# Patient Record
Sex: FEMALE | HISPANIC OR LATINO | Employment: UNEMPLOYED | URBAN - METROPOLITAN AREA
[De-identification: names, ages, dates, MRNs, and addresses within clinical notes are randomized per-mention and may not be internally consistent; named-entity substitution may affect disease eponyms.]

---

## 2022-06-08 ENCOUNTER — OFFICE VISIT (OUTPATIENT)
Dept: OBSTETRICS AND GYNECOLOGY | Facility: CLINIC | Age: 27
End: 2022-06-08

## 2022-06-08 VITALS
WEIGHT: 157 LBS | DIASTOLIC BLOOD PRESSURE: 72 MMHG | BODY MASS INDEX: 30.82 KG/M2 | SYSTOLIC BLOOD PRESSURE: 104 MMHG | HEIGHT: 60 IN

## 2022-06-08 DIAGNOSIS — N92.6 MISSED MENSES: Primary | ICD-10-CM

## 2022-06-08 LAB
B-HCG UR QL: POSITIVE
BILIRUB BLD-MCNC: NEGATIVE MG/DL
CLARITY, POC: CLEAR
COLOR UR: YELLOW
EXPIRATION DATE: ABNORMAL
GLUCOSE UR STRIP-MCNC: NEGATIVE MG/DL
INTERNAL NEGATIVE CONTROL: ABNORMAL
INTERNAL POSITIVE CONTROL: ABNORMAL
KETONES UR QL: NEGATIVE
LEUKOCYTE EST, POC: NEGATIVE
Lab: ABNORMAL
NITRITE UR-MCNC: NEGATIVE MG/ML
PH UR: 5 [PH] (ref 5–8)
PROT UR STRIP-MCNC: NEGATIVE MG/DL
RBC # UR STRIP: NEGATIVE /UL
SP GR UR: 1 (ref 1–1.03)
UROBILINOGEN UR QL: NORMAL

## 2022-06-08 PROCEDURE — 81002 URINALYSIS NONAUTO W/O SCOPE: CPT | Performed by: OBSTETRICS & GYNECOLOGY

## 2022-06-08 PROCEDURE — 81025 URINE PREGNANCY TEST: CPT | Performed by: OBSTETRICS & GYNECOLOGY

## 2022-06-08 PROCEDURE — 99203 OFFICE O/P NEW LOW 30 MIN: CPT | Performed by: OBSTETRICS & GYNECOLOGY

## 2022-06-08 RX ORDER — PRENATAL VIT NO.126/IRON/FOLIC 28MG-0.8MG
1 TABLET ORAL DAILY
COMMUNITY

## 2022-06-08 NOTE — PROGRESS NOTES
New GYN Exam    CC- Here for missed menses    Vi Marmolejo is a 27 y.o. female new patient who presents for missed menses. She just delivered in 2022 and is pregnant again. Her LMP was 3/7/2022, which would make her 13.1 weeks by dates.  Ultrasound today shows a single IUP at 8.3 weeks with a fetal heart rate of 166.  GARRETT is 1/15/2023.  There is noted to be right ovarian cyst that measures 1.7 x 1.6 x 1.3 cm.  There is no comparable imaging data.  We did discuss that she is not quite as far along as she thought.  This is her fourth pregnancy.  Her last pregnancy was a delivery in [place] on 2022.  She was induced at 37 weeks secondary to oligohydramnios.  These records have been requested.  The patient is Faroese-speaking only and a  phone was used for the visit.      OB History        4    Para   3    Term   3            AB        Living   3       SAB        IAB        Ectopic        Molar        Multiple        Live Births              Obstetric Comments   - term, - 6.11#  - term,  6.7 #  2022-  37 weeks, IOL for oligo, 6.15#  present             Menarche: 12  Current contraception: none  History of abnormal Pap smear: no  History of abnormal mammogram: no  Family history of uterine, colon or ovarian cancer: no  Family history of breast cancer: no  H/o STDs: none  Last pap:uncertain  Gardasil:missed  SABINE: none    Health Maintenance   Topic Date Due   • Annual Gynecologic Pelvic and Breast Exam  Never done   • ANNUAL PHYSICAL  Never done   • COVID-19 Vaccine (1) Never done   • TDAP/TD VACCINES (1 - Tdap) Never done   • HEPATITIS C SCREENING  Never done   • PAP SMEAR  Never done   • INFLUENZA VACCINE  10/01/2022   • Pneumococcal Vaccine 0-64  Aged Out       History reviewed. No pertinent past medical history.    Past Surgical History:   Procedure Laterality Date   • NO PAST SURGERIES           Current Outpatient Medications:   •  prenatal vitamin (prenatal, CLASSIC,  "vitamin) tablet, Take 1 tablet by mouth Daily., Disp: , Rfl:     No Known Allergies    Social History     Tobacco Use   • Smoking status: Never Smoker   Vaping Use   • Vaping Use: Never used   Substance Use Topics   • Alcohol use: Not Currently   • Drug use: Never       Family History   Problem Relation Age of Onset   • Breast cancer Neg Hx    • Ovarian cancer Neg Hx    • Uterine cancer Neg Hx    • Colon cancer Neg Hx    • Deep vein thrombosis Neg Hx    • Pulmonary embolism Neg Hx    • Birth defects Neg Hx    • Mental retardation Neg Hx        Review of Systems   Constitutional: Positive for activity change and fatigue. Negative for appetite change, fever and unexpected weight change.   Eyes: Negative for photophobia and visual disturbance.   Respiratory: Negative for cough and shortness of breath.    Cardiovascular: Negative for chest pain and palpitations.   Gastrointestinal: Negative for abdominal distention, abdominal pain, constipation, diarrhea and nausea.   Endocrine: Negative for cold intolerance and heat intolerance.   Genitourinary: Negative for dyspareunia, dysuria, menstrual problem, pelvic pain, vaginal bleeding and vaginal discharge.   Musculoskeletal: Negative for back pain.   Skin: Negative for color change and rash.   Neurological: Negative for headaches.   Hematological: Negative for adenopathy. Does not bruise/bleed easily.   Psychiatric/Behavioral: Negative for dysphoric mood. The patient is not nervous/anxious.        /72   Ht 152.4 cm (60\")   Wt 71.2 kg (157 lb)   LMP 03/07/2022   Breastfeeding No   BMI 30.66 kg/m²     Physical Exam  Vitals and nursing note reviewed.   Constitutional:       Appearance: Normal appearance. She is well-developed.   HENT:      Head: Normocephalic and atraumatic.   Eyes:      General: No scleral icterus.     Conjunctiva/sclera: Conjunctivae normal.   Neck:      Thyroid: No thyromegaly.   Cardiovascular:      Rate and Rhythm: Normal rate and regular " rhythm.   Pulmonary:      Effort: Pulmonary effort is normal.      Breath sounds: Normal breath sounds.   Abdominal:      General: There is no distension.      Palpations: Abdomen is soft. There is no mass.      Tenderness: There is no abdominal tenderness. There is no guarding or rebound.      Hernia: No hernia is present.   Skin:     General: Skin is warm and dry.   Neurological:      Mental Status: She is alert and oriented to person, place, and time.   Psychiatric:         Mood and Affect: Mood normal.         Behavior: Behavior normal.         Thought Content: Thought content normal.         Judgment: Judgment normal.               Assessment/Plan  1) Missed menses-27-year-old -0-0-3 with an IUP at 8.3 weeks and GARRETT of 1/15/2023. No labs drawn today.   2) The practice structure was explained, including that all be patients are shared in any OB provider may be the one to perform the delivery.  We discussed the timing of appointments throughout pregnancy and encouraged the patient to write down her questions and bring them with her to each appointment.  The after hours call line was also explained and she was advised to call and leave her full name, date of birth, phone number and a brief message and she will be contacted by the provider on call within 30 minutes.  If she does not receive a phone call within 30 minutes then she is advised to call again.  Most because that are unreturned her because we cannot hear the message completely.  Our no-show policy was also explained.  3) H/O oligo last pregnancy- [place] for [place]   4) RTO 2 weeks NOB.   5) Time Spent: I spent > 30 minutes caring for Vi on this date of service. This time includes time spent by me in the following activities: preparing for the visit, reviewing tests, obtaining and/or reviewing a separately obtained history, performing a medically appropriate examination and/or evaluation, counseling and educating the  patient/family/caregiver, ordering medications, tests, or procedures and documenting information in the medical record.       Diagnoses and all orders for this visit:    1. Missed menses (Primary)  -     POC Pregnancy, Urine  -     POC Urinalysis Dipstick    Other orders  -     Cancel: NuSwab VG+ - Swab, Vagina  -     Cancel: Genital Mycoplasmas HEATHER, Swab - Swab, Vagina          Candie Dejesus MD  06/08/2022  14:50 EDT

## 2022-06-24 ENCOUNTER — INITIAL PRENATAL (OUTPATIENT)
Dept: OBSTETRICS AND GYNECOLOGY | Facility: CLINIC | Age: 27
End: 2022-06-24

## 2022-06-24 VITALS — DIASTOLIC BLOOD PRESSURE: 60 MMHG | SYSTOLIC BLOOD PRESSURE: 102 MMHG | BODY MASS INDEX: 30.27 KG/M2 | WEIGHT: 155 LBS

## 2022-06-24 DIAGNOSIS — Z36.9 ENCOUNTER FOR ANTENATAL SCREENING, UNSPECIFIED: ICD-10-CM

## 2022-06-24 DIAGNOSIS — E04.9 ENLARGED THYROID: Primary | ICD-10-CM

## 2022-06-24 LAB
GLUCOSE UR STRIP-MCNC: NEGATIVE MG/DL
PROT UR STRIP-MCNC: NEGATIVE MG/DL

## 2022-06-24 PROCEDURE — 0501F PRENATAL FLOW SHEET: CPT | Performed by: NURSE PRACTITIONER

## 2022-06-24 NOTE — PROGRESS NOTES
Initial ob visit     Chief Complaint   Patient presents with   • Initial Prenatal Visit       Vi Marmolejo is being seen today for her first obstetrical visit.  She is a 27 y.o.  @  10w5d gestation. This problem is new to me: yes. She is accompanied today by her  and baby.     # 1 - Date: None, Sex: None, Weight: None, GA: None, Delivery: None, Apgar1: None, Apgar5: None, Living: None, Birth Comments: None    # 2 - Date: None, Sex: None, Weight: None, GA: None, Delivery: None, Apgar1: None, Apgar5: None, Living: None, Birth Comments: None    # 3 - Date: None, Sex: None, Weight: None, GA: None, Delivery: None, Apgar1: None, Apgar5: None, Living: None, Birth Comments: None    # 4 - Date: None, Sex: None, Weight: None, GA: None, Delivery: None, Apgar1: None, Apgar5: None, Living: None, Birth Comments: None      Taking prenatal vitamins: Yes  Planned pregnancy: Yes  Prior obstetric issues, potential pregnancy concerns: H/O Oligohydramnios     Family history of genetic issues (includes FOB): denies   Varicella Hx: has not had   Flu vaccine: has not had   COVID Vaccine: has not had   History of STDs: has not had   Current medications: PNV   Last pap smear: uncertain   Smoker: No  Drug or alcohol abuse: No  H/O Physical, mental or sexual abuse: denies   H/O mental health disorder: denies   Prior testing for Cystic Fibrosis Carrier or Sickle Cell Trait- has not had   Prepregnancy BMI: Body mass index is 30.27 kg/m².      No past medical history on file.    Past Surgical History:   Procedure Laterality Date   • NO PAST SURGERIES           Current Outpatient Medications:   •  prenatal vitamin (prenatal, CLASSIC, vitamin) tablet, Take 1 tablet by mouth Daily., Disp: , Rfl:     No Known Allergies    Social History     Socioeconomic History   • Marital status:    Tobacco Use   • Smoking status: Never Smoker   Vaping Use   • Vaping Use: Never used   Substance and Sexual Activity   • Alcohol use: Not Currently    • Drug use: Never   • Sexual activity: Yes     Partners: Male     Birth control/protection: None       Family History   Problem Relation Age of Onset   • Breast cancer Neg Hx    • Ovarian cancer Neg Hx    • Uterine cancer Neg Hx    • Colon cancer Neg Hx    • Deep vein thrombosis Neg Hx    • Pulmonary embolism Neg Hx    • Birth defects Neg Hx    • Mental retardation Neg Hx        Review of systems     All other systems reviewed and are negative except for: Genitourinary: positive for missed menses     Objective    /60   Wt 70.3 kg (155 lb)   LMP 03/07/2022   BMI 30.27 kg/m²       General Appearance:    Alert, cooperative, in no acute distress, habitus overweight   Head:    Not examined   Eyes:           Not examined   Ears:  Not examined       Neck:  No thyroid enlargement or nodules present   Back:     No kyphosis present, no scoliosis present,                       Lungs:     Clear to auscultation,respirations regular, even and                   unlabored    Heart:    Regular rhythm and normal rate, normal S1 and S2, no            murmur, no gallop, no rub, no click   Breast Exam:    No masses, No nipple discharge   Abdomen:     Normal bowel sounds, no masses, no organomegaly, soft        non-tender, non-distended, no guarding, no rebound                 tenderness   Genitalia:    Vulva - No masses, no atrophy, no lesions    Vagina - No discharge, No bleeding    Cervix - No Lesions, closed. Pap collected:Yes     Uterus - Consistent with 10 weeks.     Adnexa - No masses, non tender       Extremities:   Moves all extremities well, no edema, no cyanosis, no              redness       Skin:   No bleeding, bruising or rash       Neurologic:   Sensation intact, A&O times 3      Assessment/Plan    1) Pregnancy at 10w5d- EDC established 1/15/23 and confirmed by US at her last appt. + FHTs today.     2) OB exam: OB exam completed: Yes. New OB bag provided Yes. Pap collected: Yes.    3) Labs: OB labs collected:  Yes Counseled on genetic screening: Yes, she declines CF, SMA and FX. Counseled on Quad screen and AFP: Yes, she is too early for AFP. Counseled on NIPS: Yes, she desires NIPS.      4) Body mass index is 30.27 kg/m². Obese women with a pre-pregnancy BMI of 30+ should strive to gain approx 11-20 pounds for the entire pregnancy.     5)  Prenatal care: Oriented to the office and prenatal care. Encourage prenatal vitamins. Disc Tylenol products are fine, avoid aspirin and ibuprofen; Zika (travel restrictions/ok to use insect repellant); not to change cat litter; food restrictions; exercise;  avoidance of alcohol, tobacco, drugs and saunas/hot tubs.     6) COVID19 precautions were reviewed with the patient. Continue to encourage social distancing, wearing a mask, and good hand hygiene.  I wore a mask, protective eye wear, and gloves during this patient encounter.  Patient also wearing a surgical mask and social distancing was observed. Hand hygeine performed before and after seeing the patient. Info provided on Covid vaccine: has not had     7) Short interval between pregnancy- Check CL US next visit.     8) H/O Oligohydramnios- Delivery note indicates IOL @ 37 3/7 week d/t oligohydramnios.     9) French speaking-  used     10) OB Care: Plans on transferring to [facility] d/t logistics.     All questions answered.     RTO 4 weeks for OB tummy and US for cervical length     ADRI Jimenez  6/24/2022  10:36 EDT

## 2022-06-25 LAB
ABO GROUP BLD: NORMAL
BASOPHILS # BLD AUTO: 0 X10E3/UL (ref 0–0.2)
BASOPHILS NFR BLD AUTO: 1 %
BLD GP AB SCN SERPL QL: NEGATIVE
EOSINOPHIL # BLD AUTO: 0.1 X10E3/UL (ref 0–0.4)
EOSINOPHIL NFR BLD AUTO: 2 %
ERYTHROCYTE [DISTWIDTH] IN BLOOD BY AUTOMATED COUNT: 12.9 % (ref 11.7–15.4)
HBA1C MFR BLD: 5.3 % (ref 4.8–5.6)
HBV SURFACE AG SERPL QL IA: NEGATIVE
HCT VFR BLD AUTO: 35.6 % (ref 34–46.6)
HCV AB S/CO SERPL IA: <0.1 S/CO RATIO (ref 0–0.9)
HGB BLD-MCNC: 11.9 G/DL (ref 11.1–15.9)
HIV 1+2 AB+HIV1 P24 AG SERPL QL IA: NON REACTIVE
IMM GRANULOCYTES # BLD AUTO: 0 X10E3/UL (ref 0–0.1)
IMM GRANULOCYTES NFR BLD AUTO: 0 %
LYMPHOCYTES # BLD AUTO: 1 X10E3/UL (ref 0.7–3.1)
LYMPHOCYTES NFR BLD AUTO: 21 %
MCH RBC QN AUTO: 31.2 PG (ref 26.6–33)
MCHC RBC AUTO-ENTMCNC: 33.4 G/DL (ref 31.5–35.7)
MCV RBC AUTO: 93 FL (ref 79–97)
MONOCYTES # BLD AUTO: 0.3 X10E3/UL (ref 0.1–0.9)
MONOCYTES NFR BLD AUTO: 7 %
NEUTROPHILS # BLD AUTO: 3.3 X10E3/UL (ref 1.4–7)
NEUTROPHILS NFR BLD AUTO: 69 %
PLATELET # BLD AUTO: 337 X10E3/UL (ref 150–450)
RBC # BLD AUTO: 3.82 X10E6/UL (ref 3.77–5.28)
RH BLD: POSITIVE
RPR SER QL: NON REACTIVE
RUBV IGG SERPL IA-ACNC: 1.92 INDEX
TSH SERPL DL<=0.005 MIU/L-ACNC: 1.04 UIU/ML (ref 0.45–4.5)
VZV IGG SER IA-ACNC: 686 INDEX
WBC # BLD AUTO: 4.7 X10E3/UL (ref 3.4–10.8)

## 2022-06-26 LAB
AMPHETAMINES UR QL SCN: NEGATIVE NG/ML
BACTERIA UR CULT: NO GROWTH
BACTERIA UR CULT: NORMAL
BARBITURATES UR QL SCN: NEGATIVE NG/ML
BENZODIAZ UR QL SCN: NEGATIVE NG/ML
BZE UR QL SCN: NEGATIVE NG/ML
CANNABINOIDS UR QL SCN: NEGATIVE NG/ML
CREAT UR-MCNC: 176.7 MG/DL (ref 20–300)
LABORATORY COMMENT REPORT: NORMAL
METHADONE UR QL SCN: NEGATIVE NG/ML
OPIATES UR QL SCN: NEGATIVE NG/ML
OXYCODONE+OXYMORPHONE UR QL SCN: NEGATIVE NG/ML
PCP UR QL: NEGATIVE NG/ML
PH UR: 5.3 [PH] (ref 4.5–8.9)
PROPOXYPH UR QL SCN: NEGATIVE NG/ML

## 2022-06-29 LAB
C TRACH RRNA CVX QL NAA+PROBE: NEGATIVE
CONV .: NORMAL
CYTOLOGIST CVX/VAG CYTO: NORMAL
CYTOLOGY CVX/VAG DOC CYTO: NORMAL
CYTOLOGY CVX/VAG DOC THIN PREP: NORMAL
DX ICD CODE: NORMAL
HIV 1 & 2 AB SER-IMP: NORMAL
N GONORRHOEA RRNA CVX QL NAA+PROBE: NEGATIVE
OTHER STN SPEC: NORMAL
PATHOLOGIST CVX/VAG CYTO: NORMAL
STAT OF ADQ CVX/VAG CYTO-IMP: NORMAL
T VAGINALIS RRNA SPEC QL NAA+PROBE: NEGATIVE

## 2022-08-16 ENCOUNTER — ROUTINE PRENATAL (OUTPATIENT)
Dept: OBSTETRICS AND GYNECOLOGY | Facility: CLINIC | Age: 27
End: 2022-08-16

## 2022-08-16 VITALS — WEIGHT: 151 LBS | BODY MASS INDEX: 29.49 KG/M2 | DIASTOLIC BLOOD PRESSURE: 68 MMHG | SYSTOLIC BLOOD PRESSURE: 102 MMHG

## 2022-08-16 DIAGNOSIS — Z34.92 PRENATAL CARE IN SECOND TRIMESTER: Primary | ICD-10-CM

## 2022-08-16 LAB
EXTERNAL NIPT: NORMAL
GLUCOSE UR STRIP-MCNC: NEGATIVE MG/DL
PROT UR STRIP-MCNC: NEGATIVE MG/DL

## 2022-08-16 PROCEDURE — 0502F SUBSEQUENT PRENATAL CARE: CPT | Performed by: NURSE PRACTITIONER

## 2022-08-16 NOTE — PROGRESS NOTES
OB follow up     CC:  Here for prenatal follow up    Vi Marmolejo is a 27 y.o.  18w2d being seen today for her obstetrical visit.  Patient reports no complaints. Taking +PNV.     Review of Systems  Genitourinary: Neg for cramping, vaginal bleeding, SROM, or dysuria.       /68   Wt 68.5 kg (151 lb)   LMP 2022   BMI 29.49 kg/m²     FHT: 140s  BPM   Uterine Size: size equals dates     Assessment    1) Pregnancy at 18w2d- US IMP: Breech. Normal anatomy US. Male.  bpm. CL 4.3cm.    2) NIPS neg.  Check AFP today.     3) Short interval between pregnancy- CL 4.3cm.      4) H/O Oligohydramnios- Delivery note indicates IOL @ 37 3/7 week d/t oligohydramnios.      5) Croatian speaking-  used      6) OB Care: Plans on transferring to [facility] d/t logistics      Plan    Continue prenatal vitamins   Reviewed this stage of pregnancy  Problem list updated   Follow up in 4 weeks    ADRI Jimenez  2022  10:34 EDT

## 2022-08-18 LAB
AFP INTERP SERPL-IMP: NORMAL
AFP INTERP SERPL-IMP: NORMAL
AFP MOM SERPL: 0.76
AFP SERPL-MCNC: 35.8 NG/ML
AGE AT DELIVERY: 27.9 YR
GA METHOD: NORMAL
GA: 18.3 WEEKS
IDDM PATIENT QL: NO
LABORATORY COMMENT REPORT: NORMAL
MULTIPLE PREGNANCY: NO
NEURAL TUBE DEFECT RISK FETUS: NORMAL %
RESULT: NORMAL

## 2022-08-19 ENCOUNTER — TELEPHONE (OUTPATIENT)
Dept: OBSTETRICS AND GYNECOLOGY | Facility: CLINIC | Age: 27
End: 2022-08-19

## 2022-08-19 NOTE — TELEPHONE ENCOUNTER
Caller: Vi Marmolejo    Relationship: Self    Best call back number: [phone]    What is the best time to reach you: PT WILL NEED A -CALL ANYTIME, IT IS OKAY TO LVM    Who are you requesting to speak with (clinical staff, provider,  specific staff member):ANYONE    Do you know the name of the person who called: NO    What was the call regarding: PT IS NOT SURE.    Do you require a callback: PT CALLED W/SISTER RAUL(HELP TRANSLATE). PT HAD A MISSED CALL FROM OFFICE AND RETURNING THE CALL.

## 2022-08-23 ENCOUNTER — APPOINTMENT (OUTPATIENT)
Dept: ULTRASOUND IMAGING | Facility: HOSPITAL | Age: 27
End: 2022-08-23

## 2022-08-23 ENCOUNTER — HOSPITAL ENCOUNTER (EMERGENCY)
Facility: HOSPITAL | Age: 27
Discharge: HOME | End: 2022-08-23
Attending: EMERGENCY MEDICINE | Admitting: EMERGENCY MEDICINE

## 2022-08-23 VITALS
RESPIRATION RATE: 16 BRPM | DIASTOLIC BLOOD PRESSURE: 58 MMHG | HEART RATE: 70 BPM | OXYGEN SATURATION: 98 % | WEIGHT: 151 LBS | TEMPERATURE: 97 F | SYSTOLIC BLOOD PRESSURE: 108 MMHG

## 2022-08-23 DIAGNOSIS — N93.9 SCANT VAGINAL BLEEDING: Primary | ICD-10-CM

## 2022-08-23 LAB
ABO GROUP BLD: NORMAL
ALBUMIN SERPL-MCNC: 3.8 G/DL (ref 3.5–5.2)
ALBUMIN/GLOB SERPL: 1.3 G/DL
ALP SERPL-CCNC: 86 U/L (ref 39–117)
ALT SERPL W P-5'-P-CCNC: 20 U/L (ref 1–33)
ANION GAP SERPL CALCULATED.3IONS-SCNC: 9.2 MMOL/L (ref 5–15)
AST SERPL-CCNC: 18 U/L (ref 1–32)
BASOPHILS # BLD AUTO: 0.03 10*3/MM3 (ref 0–0.2)
BASOPHILS NFR BLD AUTO: 0.6 % (ref 0–1.5)
BILIRUB SERPL-MCNC: <0.2 MG/DL (ref 0–1.2)
BILIRUB UR QL STRIP: NEGATIVE
BUN SERPL-MCNC: 5 MG/DL (ref 6–20)
BUN/CREAT SERPL: 9.3 (ref 7–25)
CALCIUM SPEC-SCNC: 8.9 MG/DL (ref 8.6–10.5)
CHLORIDE SERPL-SCNC: 103 MMOL/L (ref 98–107)
CLARITY UR: CLEAR
CO2 SERPL-SCNC: 23.8 MMOL/L (ref 22–29)
COLOR UR: YELLOW
CREAT SERPL-MCNC: 0.54 MG/DL (ref 0.57–1)
DEPRECATED RDW RBC AUTO: 43.4 FL (ref 37–54)
EGFRCR SERPLBLD CKD-EPI 2021: 129.6 ML/MIN/1.73
EOSINOPHIL # BLD AUTO: 0.13 10*3/MM3 (ref 0–0.4)
EOSINOPHIL NFR BLD AUTO: 2.6 % (ref 0.3–6.2)
ERYTHROCYTE [DISTWIDTH] IN BLOOD BY AUTOMATED COUNT: 12.5 % (ref 12.3–15.4)
GLOBULIN UR ELPH-MCNC: 2.9 GM/DL
GLUCOSE SERPL-MCNC: 98 MG/DL (ref 65–99)
GLUCOSE UR STRIP-MCNC: NEGATIVE MG/DL
HCT VFR BLD AUTO: 35.8 % (ref 34–46.6)
HGB BLD-MCNC: 11.7 G/DL (ref 12–15.9)
HGB UR QL STRIP.AUTO: NEGATIVE
IMM GRANULOCYTES # BLD AUTO: 0 10*3/MM3 (ref 0–0.05)
IMM GRANULOCYTES NFR BLD AUTO: 0 % (ref 0–0.5)
KETONES UR QL STRIP: NEGATIVE
LEUKOCYTE ESTERASE UR QL STRIP.AUTO: NEGATIVE
LYMPHOCYTES # BLD AUTO: 0.97 10*3/MM3 (ref 0.7–3.1)
LYMPHOCYTES NFR BLD AUTO: 19.1 % (ref 19.6–45.3)
MCH RBC QN AUTO: 30.5 PG (ref 26.6–33)
MCHC RBC AUTO-ENTMCNC: 32.7 G/DL (ref 31.5–35.7)
MCV RBC AUTO: 93.5 FL (ref 79–97)
MONOCYTES # BLD AUTO: 0.35 10*3/MM3 (ref 0.1–0.9)
MONOCYTES NFR BLD AUTO: 6.9 % (ref 5–12)
NEUTROPHILS NFR BLD AUTO: 3.61 10*3/MM3 (ref 1.7–7)
NEUTROPHILS NFR BLD AUTO: 70.8 % (ref 42.7–76)
NITRITE UR QL STRIP: NEGATIVE
PH UR STRIP.AUTO: 7.5 [PH] (ref 4.5–8)
PLATELET # BLD AUTO: 262 10*3/MM3 (ref 140–450)
PMV BLD AUTO: 8.9 FL (ref 6–12)
POTASSIUM SERPL-SCNC: 3.8 MMOL/L (ref 3.5–5.2)
PROT SERPL-MCNC: 6.7 G/DL (ref 6–8.5)
PROT UR QL STRIP: NEGATIVE
RBC # BLD AUTO: 3.83 10*6/MM3 (ref 3.77–5.28)
RH BLD: POSITIVE
SODIUM SERPL-SCNC: 136 MMOL/L (ref 136–145)
SP GR UR STRIP: 1.02 (ref 1–1.03)
UROBILINOGEN UR QL STRIP: NORMAL
WBC NRBC COR # BLD: 5.09 10*3/MM3 (ref 3.4–10.8)

## 2022-08-23 PROCEDURE — 81003 URINALYSIS AUTO W/O SCOPE: CPT | Performed by: EMERGENCY MEDICINE

## 2022-08-23 PROCEDURE — 76805 OB US >/= 14 WKS SNGL FETUS: CPT

## 2022-08-23 PROCEDURE — 85025 COMPLETE CBC W/AUTO DIFF WBC: CPT | Performed by: EMERGENCY MEDICINE

## 2022-08-23 PROCEDURE — 80053 COMPREHEN METABOLIC PANEL: CPT | Performed by: EMERGENCY MEDICINE

## 2022-08-23 PROCEDURE — 99283 EMERGENCY DEPT VISIT LOW MDM: CPT

## 2022-08-23 PROCEDURE — 86901 BLOOD TYPING SEROLOGIC RH(D): CPT | Performed by: EMERGENCY MEDICINE

## 2022-08-23 PROCEDURE — 86900 BLOOD TYPING SEROLOGIC ABO: CPT | Performed by: EMERGENCY MEDICINE

## 2022-08-23 RX ORDER — SODIUM CHLORIDE 0.9 % (FLUSH) 0.9 %
10 SYRINGE (ML) INJECTION AS NEEDED
Status: DISCONTINUED | OUTPATIENT
Start: 2022-08-23 | End: 2022-08-23 | Stop reason: HOSPADM

## 2022-08-23 RX ADMIN — SODIUM CHLORIDE 500 ML: 9 INJECTION, SOLUTION INTRAVENOUS at 13:00

## 2022-08-23 NOTE — ED PROVIDER NOTES
EMERGENCY DEPARTMENT ENCOUNTER      Room Number: 10/10    History is provided by the patient, no translation services needed    HPI:    Chief complaint: Spotting during pregnancy    Location:     Quality/Severity: Patient denies any pain.    Timing/Duration: 3 days    Modifying Factors: None    Associated Symptoms: None    Narrative: Pt is a 27 y.o. female who presents complaining of spotting during pregnancy x3 days.  The patient is G4, P3.  She advises that on Saturday she noticed a small amount of red blood when she wiped after urination.  She advises that the bleeding did not continue.  Approximately 26 hours later she had another episode of blood present when wiping.  She called the clinic today in the clinic and advised her to go to the ED to be evaluated.  Patient advises that she is 19 weeks pregnant.  She states that she still feels her baby moving.  The patient denies any abdominal pain.  She denies any vomiting, diarrhea, constipation, dysuria.  Patient denies any chest pain or shortness of breath.  She denies any dizziness.  Patient does admit to intermittent headaches and nausea in the mornings.      PMD: Provider, No Known    REVIEW OF SYSTEMS  Review of Systems   Constitutional: Negative for fever.   HENT: Negative for congestion.    Eyes: Negative for visual disturbance.   Respiratory: Negative for cough, chest tightness and shortness of breath.    Cardiovascular: Negative for chest pain and leg swelling.   Gastrointestinal: Positive for nausea (In the mornings). Negative for abdominal pain, blood in stool, constipation, diarrhea and vomiting.   Genitourinary: Positive for vaginal bleeding (Minimal amount). Negative for difficulty urinating, dysuria, flank pain and hematuria.   Musculoskeletal: Negative for gait problem and neck pain.   Skin: Negative for color change, pallor, rash and wound.   Neurological: Positive for headaches (Intermittent). Negative for dizziness, syncope and weakness.    Psychiatric/Behavioral: Negative for confusion. The patient is not nervous/anxious.          PAST MEDICAL HISTORY  Active Ambulatory Problems     Diagnosis Date Noted   • No Active Ambulatory Problems     Resolved Ambulatory Problems     Diagnosis Date Noted   • No Resolved Ambulatory Problems     No Additional Past Medical History       PAST SURGICAL HISTORY  History reviewed. No pertinent surgical history.    FAMILY HISTORY  History reviewed. No pertinent family history.    SOCIAL HISTORY  Social History     Socioeconomic History   • Marital status:        ALLERGIES  Patient has no known allergies.      Current Facility-Administered Medications:   •  [COMPLETED] Insert peripheral IV, , , Once **AND** sodium chloride 0.9 % flush 10 mL, 10 mL, Intravenous, PRN, Ellyn Hancock MD  No current outpatient medications on file.    PHYSICAL EXAM  ED Triage Vitals [08/23/22 1122]   Temp Heart Rate Resp BP SpO2   97 °F (36.1 °C) 70 16 108/58 98 %      Temp src Heart Rate Source Patient Position BP Location FiO2 (%)   -- -- -- -- --       Physical Exam  Vitals and nursing note reviewed.   Constitutional:       General: She is not in acute distress.     Appearance: Normal appearance. She is not ill-appearing, toxic-appearing or diaphoretic.   HENT:      Head: Normocephalic and atraumatic.   Eyes:      Extraocular Movements: Extraocular movements intact.      Conjunctiva/sclera: Conjunctivae normal.      Pupils: Pupils are equal, round, and reactive to light.   Cardiovascular:      Rate and Rhythm: Normal rate and regular rhythm.      Heart sounds: Normal heart sounds.   Pulmonary:      Effort: Pulmonary effort is normal. No respiratory distress.      Breath sounds: Normal breath sounds. No stridor. No wheezing, rhonchi or rales.   Chest:      Chest wall: No tenderness.   Abdominal:      General: Bowel sounds are normal. There is no distension.      Palpations: Abdomen is soft. There is no mass.      Tenderness:  There is no abdominal tenderness. There is no guarding or rebound.   Musculoskeletal:         General: No swelling, tenderness, deformity or signs of injury. Normal range of motion.      Cervical back: Normal range of motion and neck supple.      Right lower leg: No edema.      Left lower leg: No edema.   Skin:     General: Skin is warm and dry.      Coloration: Skin is not jaundiced or pale.      Findings: No bruising, erythema, lesion or rash.   Neurological:      Mental Status: She is alert and oriented to person, place, and time.   Psychiatric:         Mood and Affect: Mood and affect normal.           LAB RESULTS  Lab Results (last 24 hours)     Procedure Component Value Units Date/Time    CBC & Differential [[id number]]  (Abnormal) Collected: 08/23/22 1226    Specimen: Blood Updated: 08/23/22 1233    Narrative:      The following orders were created for panel order CBC & Differential.  Procedure                               Abnormality         Status                     ---------                               -----------         ------                     CBC Auto Differential[[id number]]        Abnormal            Final result                 Please view results for these tests on the individual orders.    Comprehensive Metabolic Panel [[id number]]  (Abnormal) Collected: 08/23/22 1226    Specimen: Blood Updated: 08/23/22 1301     Glucose 98 mg/dL      BUN 5 mg/dL      Creatinine 0.54 mg/dL      Sodium 136 mmol/L      Potassium 3.8 mmol/L      Chloride 103 mmol/L      CO2 23.8 mmol/L      Calcium 8.9 mg/dL      Total Protein 6.7 g/dL      Albumin 3.80 g/dL      ALT (SGPT) 20 U/L      AST (SGOT) 18 U/L      Alkaline Phosphatase 86 U/L      Total Bilirubin <0.2 mg/dL      Globulin 2.9 gm/dL      A/G Ratio 1.3 g/dL      BUN/Creatinine Ratio 9.3     Anion Gap 9.2 mmol/L      eGFR 129.6 mL/min/1.73      Comment: National Kidney Foundation and American Society of Nephrology (ASN) Task Force recommended calculation  based on the Chronic Kidney Disease Epidemiology Collaboration (CKD-EPI) equation refit without adjustment for race.       Narrative:      GFR Normal >60  Chronic Kidney Disease <60  Kidney Failure <15      CBC Auto Differential [[id number]]  (Abnormal) Collected: 08/23/22 1226    Specimen: Blood Updated: 08/23/22 1233     WBC 5.09 10*3/mm3      RBC 3.83 10*6/mm3      Hemoglobin 11.7 g/dL      Hematocrit 35.8 %      MCV 93.5 fL      MCH 30.5 pg      MCHC 32.7 g/dL      RDW 12.5 %      RDW-SD 43.4 fl      MPV 8.9 fL      Platelets 262 10*3/mm3      Neutrophil % 70.8 %      Lymphocyte % 19.1 %      Monocyte % 6.9 %      Eosinophil % 2.6 %      Basophil % 0.6 %      Immature Grans % 0.0 %      Neutrophils, Absolute 3.61 10*3/mm3      Lymphocytes, Absolute 0.97 10*3/mm3      Monocytes, Absolute 0.35 10*3/mm3      Eosinophils, Absolute 0.13 10*3/mm3      Basophils, Absolute 0.03 10*3/mm3      Immature Grans, Absolute 0.00 10*3/mm3     Urinalysis With Culture If Indicated - Urine, Clean Catch [[id number]]  (Normal) Collected: 08/23/22 1501    Specimen: Urine, Clean Catch Updated: 08/23/22 1511     Color, UA Yellow     Appearance, UA Clear     pH, UA 7.5     Specific Gravity, UA 1.020     Glucose, UA Negative     Ketones, UA Negative     Bilirubin, UA Negative     Blood, UA Negative     Protein, UA Negative     Leuk Esterase, UA Negative     Nitrite, UA Negative     Urobilinogen, UA 0.2 E.U./dL    Narrative:      In absence of clinical symptoms, the presence of pyuria, bacteria, and/or nitrites on the urinalysis result does not correlate with infection.  Urine microscopic not indicated.            I ordered the above labs and reviewed the results    RADIOLOGY  US Ob 14 + Weeks Single or First Gestation    Result Date: 8/23/2022  OBSTETRIC ULTRASOUND, 2ND TRIMESTER, LIMITED, 08/23/2022  HISTORY: 27-year-old female, pregnant in 2nd trimester, presenting to the ED after noting 4 day history of vaginal spotting. No prior imaging  here.  TECHNIQUE: Transabdominal obstetric ultrasound examination.  FINDINGS: *  Single living intrauterine gestation in breech position. *  Fetal heart rate measures 155 bpm. *  Amniotic fluid volume is normal. *  The placenta is anterior and is not low-lying. *  Cervix measures about 4.4 cm in length.  FETAL BIOMETRY: BPD: 19 weeks, 1 day. HC: 19 weeks, 3 days. AC: 18 weeks, 5 days. FL: 19 weeks, 5 days.  Fetal age by current ultrasound: 19 weeks, 2 days. GARRETT: 01/15/2023. Estimated fetal weight on current exam: 275 g.      1.  Single living intrauterine pregnancy at 19 weeks, 2 days. 2.  Fetal heart rate measures 155 bpm. 3.  Anterior placenta, no previa. Normal amniotic fluid volume.  This report was finalized on 8/23/2022 1:10 PM by Dr. Kenji Gómez MD.        I ordered the above radiologic testing and reviewed the results    PROCEDURES  Procedures      PROGRESS AND CONSULTS  ED Course as of 08/23/22 1519   Tue Aug 23, 2022   1401 Pelvic exam performed by Dr. Hancock prior to my arrival.  She advises that the pelvic exam was within normal limits.    External genitalia is normal. No lesions appreciated.  Vagina is without erythema, bleeding, tenderness or discharge.  Cervical Os is closed with no discharge.  There is no CMT, uterine, or adnexal tenderness. No masses appreciated.  Patient tolerated well.  Performed by Dr. Hancock.   []   2090 Of note, I had already fully seen and examined this patient prior to Ms. Steiner arrival.  However, for some reason my name was not assigned to this patient and Ms. Jatin was unaware of this and proceeded to see the patient.  Therefore the patient has been fully seen by me as well as MsGeorge Jatin.  We have discussed the case and are in agreement with the plan of care. []      ED Course User Index  [] Marisol Steiner PA-C  [] Ellyn Hancock MD           MEDICAL DECISION MAKING    MDM     My differential diagnosis for abdominal pain includes but is not  limited to:  Gastritis, gastroenteritis, peptic ulcer disease, GERD, esophageal perforation, acute appendicitis, mesenteric adenitis, Meckel’s diverticulum, epiploic appendagitis, diverticulitis, colon cancer, ulcerative colitis, Crohn’s disease, intussusception, small bowel obstruction, adhesions, ischemic bowel, perforated viscus, ileus, obstipation, biliary colic, cholecystitis, cholelithiasis, Bry-Yohan Ritchie, hepatitis, pancreatitis, common bile duct obstruction, cholangitis, bile leak, splenic trauma, splenic rupture, splenic infarction, splenic abscess, abdominal abscess, ascites, spontaneous bacterial peritonitis, hernia, UTI, cystitis,ureterolithiasis, urinary obstruction, ovarian cyst, torsion, pregnancy, ectopic pregnancy, PID, pelvic abscess, mittelschmerz, endometriosis, AAA, myocardial infarction, pneumonia, cancer, porphyria, DKA, medications, sickle cell, viral syndrome, zoster  DIAGNOSIS  Final diagnoses:   Scant vaginal bleeding       Latest Documented Vital Signs:  As of 15:19 EDT  BP- 108/58 HR- 70 Temp- 97 °F (36.1 °C) O2 sat- 98%    DISPOSITION  Pt discharged    Discussed pertinent findings with the patient/family.  Patient/Family voiced understanding of need to follow-up for recheck and further testing as needed.  Return to the Emergency Department warnings were given.         Medication List      No changes were made to your prescriptions during this visit.              Follow-up Information     Provider, No Known. Call today.    Why: to schedule follow up  Contact information:  [facility] [address] [phone]             Candie Dejesus MD. Call today.    Specialties: Obstetrics and Gynecology, Gynecology  Why: to schedule follow up  Contact information:  [address] [place] [address] [phone]             Go to  [facility] Emergency Department.    Specialty: Emergency Medicine  Why: If symptoms worsen  Contact  information:  [address] [facility] [address]  [place] [id number] [phone]                         Dictated utilizing Dragon dictation     Marisol Steiner PA-C  08/23/22 2346

## 2022-08-30 ENCOUNTER — ROUTINE PRENATAL (OUTPATIENT)
Dept: OBSTETRICS AND GYNECOLOGY | Facility: CLINIC | Age: 27
End: 2022-08-30

## 2022-08-30 VITALS — WEIGHT: 153 LBS | SYSTOLIC BLOOD PRESSURE: 100 MMHG | BODY MASS INDEX: 29.88 KG/M2 | DIASTOLIC BLOOD PRESSURE: 76 MMHG

## 2022-08-30 DIAGNOSIS — Z34.92 PRENATAL CARE IN SECOND TRIMESTER: ICD-10-CM

## 2022-08-30 DIAGNOSIS — O46.90 VAGINAL BLEEDING IN PREGNANCY: Primary | ICD-10-CM

## 2022-08-30 DIAGNOSIS — D64.9 MILD ANEMIA: ICD-10-CM

## 2022-08-30 LAB
2ND TRIMESTER 4 SCREEN SERPL-IMP: NORMAL
GLUCOSE UR STRIP-MCNC: NEGATIVE MG/DL
PROT UR STRIP-MCNC: NEGATIVE MG/DL

## 2022-08-30 PROCEDURE — 0502F SUBSEQUENT PRENATAL CARE: CPT | Performed by: NURSE PRACTITIONER

## 2022-08-30 RX ORDER — DOXYCYCLINE HYCLATE 50 MG/1
324 CAPSULE, GELATIN COATED ORAL 2 TIMES DAILY
Qty: 60 TABLET | Refills: 2 | Status: SHIPPED | OUTPATIENT
Start: 2022-08-30

## 2022-08-30 NOTE — PROGRESS NOTES
OB follow up     CC:  Here for prenatal follow up     Vi Pearl is a 27 y.o.  20w2d being seen today for her obstetrical visit. Pt is being seen today as a follow up. She was seen in the ER last week for vaginal bleeding during pregnancy.  The bleeding was described as light in amount and red in color. She denies pain with her bleeding. She denies having SIC prior to the bleeding.     Review of Systems  Genitourinary: Neg for cramping, SROM, or dysuria. + Vaginal bleeding.       /76   Wt 69.4 kg (153 lb)   LMP 2022   BMI 29.88 kg/m²     FHT: 130s   BPM   Uterine Size: size equals dates     Assessment    1) Pregnancy at 20w2d-     2) NIPS neg.  AFP neg.     3) Short interval between pregnancy- CL 4.3cm@ 18 weeks. Needs repeat CL US     4) H/O Oligohydramnios- Delivery note indicates IOL @ 37 3/7 week d/t oligohydramnios.      5) Ivorian speaking-  used      6) OB Care: Plans on transferring to [facility] d/t logistics    7) Bleeding in pregnanc: Instructed on pelvic rest Bleeding has resolved. Check NuSwab. Check repeat US today. Rh +.     US IMP in the ER on 22: IMPRESSION: 1.  Single living intrauterine pregnancy at 19 weeks, 2 days.          2.  Fetal heart rate measures 155 bpm.         3.  Anterior placenta, no previa. Normal amniotic fluid volume.    8) Mild anemia: Hgb 11.7g/dL. Start iron, erx sent.         Plan    Continue prenatal vitamins   Reviewed this stage of pregnancy  Problem list updated   Keep scheduled appt and US tomorrow (pt can not stay today for US)     Megan Drake, ADRI  2022  11:35 EDT

## 2022-09-04 LAB
A VAGINAE DNA VAG QL NAA+PROBE: ABNORMAL SCORE
BVAB2 DNA VAG QL NAA+PROBE: ABNORMAL SCORE
C ALBICANS DNA VAG QL NAA+PROBE: NEGATIVE
C GLABRATA DNA VAG QL NAA+PROBE: NEGATIVE
C TRACH DNA VAG QL NAA+PROBE: NEGATIVE
M GENITALIUM DNA SPEC QL NAA+PROBE: POSITIVE
M HOMINIS DNA SPEC QL NAA+PROBE: NEGATIVE
MEGA1 DNA VAG QL NAA+PROBE: ABNORMAL SCORE
N GONORRHOEA DNA VAG QL NAA+PROBE: NEGATIVE
T VAGINALIS DNA VAG QL NAA+PROBE: NEGATIVE
UREAPLASMA DNA SPEC QL NAA+PROBE: POSITIVE

## 2022-09-06 RX ORDER — AZITHROMYCIN 500 MG/1
TABLET, FILM COATED ORAL
Qty: 5 TABLET | Refills: 0 | Status: SHIPPED | OUTPATIENT
Start: 2022-09-06

## 2022-09-06 RX ORDER — METRONIDAZOLE 500 MG/1
500 TABLET ORAL 2 TIMES DAILY
Qty: 14 TABLET | Refills: 0 | Status: SHIPPED | OUTPATIENT
Start: 2022-09-06 | End: 2022-09-13

## 2022-09-15 ENCOUNTER — ROUTINE PRENATAL (OUTPATIENT)
Dept: OBSTETRICS AND GYNECOLOGY | Facility: CLINIC | Age: 27
End: 2022-09-15

## 2022-09-15 VITALS — WEIGHT: 150 LBS | DIASTOLIC BLOOD PRESSURE: 64 MMHG | SYSTOLIC BLOOD PRESSURE: 102 MMHG | BODY MASS INDEX: 29.29 KG/M2

## 2022-09-15 DIAGNOSIS — Z34.92 PRENATAL CARE IN SECOND TRIMESTER: Primary | ICD-10-CM

## 2022-09-15 DIAGNOSIS — A49.3 NONGONOCOCCAL URETHRITIS DUE TO UREAPLASMA UREALYTICUM: ICD-10-CM

## 2022-09-15 DIAGNOSIS — O09.892 SHORT INTERVAL BETWEEN PREGNANCIES AFFECTING PREGNANCY IN SECOND TRIMESTER, ANTEPARTUM: ICD-10-CM

## 2022-09-15 DIAGNOSIS — N34.1 NONGONOCOCCAL URETHRITIS DUE TO UREAPLASMA UREALYTICUM: ICD-10-CM

## 2022-09-15 LAB
GLUCOSE UR STRIP-MCNC: NEGATIVE MG/DL
PROT UR STRIP-MCNC: NEGATIVE MG/DL

## 2022-09-15 PROCEDURE — 0502F SUBSEQUENT PRENATAL CARE: CPT | Performed by: NURSE PRACTITIONER

## 2022-09-15 NOTE — PROGRESS NOTES
OB follow up     CC:  Here for prenatal follow up     Vi Pearl is a 27 y.o.  22w4d being seen today for her obstetrical visit. She is accompanied by her partner today.  She is taking PNV. She has not picked up any of her prescriptions from the pharmacy.       Review of Systems  Genitourinary: Neg for cramping, SROM, or dysuria.        /64   Wt 68 kg (150 lb)   LMP 2022   BMI 29.29 kg/m²     FHT: 140s  BPM   Uterine Size: size equals dates     Assessment    1) Pregnancy at 22w4d-     2) NIPS neg. AFP neg.     3) Short interval between pregnancy- CL 5.45cm on 22.      4) H/O Oligohydramnios- Delivery note indicates IOL @ 37 3/7 week d/t oligohydramnios.      5) Kyrgyz speaking-  used      6) OB Care: Plans on transferring to [facility] d/t logistics    7) Bleeding in pregnancy: Rh +. Resolved.     8) Mild anemia: Hgb 11.7g/dL. Taking iron.     9) Abnormal NuSwab: + ureaplasma, mycoplasma genitalium and BV. She was prescribed Flagyl and zithromax but she has not picked up medication from the pharmacy. Enc pt to  medication. Rec partner treatment.      10) Encouraged the flu vaccine during pregnancy. Discuss normal physiological changes during pregnancy increase the susceptibility of the flu virus and increase the risk of severe illness for the pregnant woman. Disc flu can be harmful to the unborn baby as well. Enc the flu vaccine. Disc with patient that getting the flu vaccine is the first and most important step in protecting against the flu. Flu vaccines given during pregnancy help protect both the mother and the baby. Getting the flu vaccine during pregnancy also helps protect the  from flu illness for several months after their birth, when then are too young to get vaccinated. Also disc the importance of good hand hygiene and avoiding people who are sick. The patient declines the flu vaccine.       Plan    Continue prenatal vitamins   Reviewed this  stage of pregnancy  Problem list updated   RTO 4 weeks     Megan Drake, ADRI  9/15/2022  13:09 EDT

## 2022-10-17 ENCOUNTER — ROUTINE PRENATAL (OUTPATIENT)
Dept: OBSTETRICS AND GYNECOLOGY | Facility: CLINIC | Age: 27
End: 2022-10-17

## 2022-10-17 VITALS — SYSTOLIC BLOOD PRESSURE: 100 MMHG | WEIGHT: 160 LBS | BODY MASS INDEX: 31.25 KG/M2 | DIASTOLIC BLOOD PRESSURE: 72 MMHG

## 2022-10-17 DIAGNOSIS — Z78.9 USES SPANISH AS PRIMARY SPOKEN LANGUAGE: ICD-10-CM

## 2022-10-17 DIAGNOSIS — Z23 NEEDS FLU SHOT: ICD-10-CM

## 2022-10-17 DIAGNOSIS — Z34.93 PRENATAL CARE IN THIRD TRIMESTER: Primary | ICD-10-CM

## 2022-10-17 DIAGNOSIS — O09.299 HISTORY OF OLIGOHYDRAMNIOS IN PRIOR PREGNANCY, CURRENTLY PREGNANT: ICD-10-CM

## 2022-10-17 DIAGNOSIS — D50.8 OTHER IRON DEFICIENCY ANEMIA: ICD-10-CM

## 2022-10-17 DIAGNOSIS — O26.849 FUNDAL HEIGHT LOW FOR DATES, ANTEPARTUM: ICD-10-CM

## 2022-10-17 DIAGNOSIS — Z23 NEED FOR TDAP VACCINATION: ICD-10-CM

## 2022-10-17 PROBLEM — D64.9 ANEMIA, UNSPECIFIED: Status: ACTIVE | Noted: 2022-10-17

## 2022-10-17 LAB
GLUCOSE UR STRIP-MCNC: NEGATIVE MG/DL
PROT UR STRIP-MCNC: NEGATIVE MG/DL

## 2022-10-17 PROCEDURE — 90715 TDAP VACCINE 7 YRS/> IM: CPT | Performed by: OBSTETRICS & GYNECOLOGY

## 2022-10-17 PROCEDURE — 90472 IMMUNIZATION ADMIN EACH ADD: CPT | Performed by: OBSTETRICS & GYNECOLOGY

## 2022-10-17 PROCEDURE — 0502F SUBSEQUENT PRENATAL CARE: CPT | Performed by: OBSTETRICS & GYNECOLOGY

## 2022-10-17 PROCEDURE — 90471 IMMUNIZATION ADMIN: CPT | Performed by: OBSTETRICS & GYNECOLOGY

## 2022-10-17 PROCEDURE — 90686 IIV4 VACC NO PRSV 0.5 ML IM: CPT | Performed by: OBSTETRICS & GYNECOLOGY

## 2022-10-17 NOTE — PROGRESS NOTES
OB follow up     Vi Pearl is a 27 y.o.  27w1d being seen today for her obstetrical visit.  Patient reports no complaints. Fetal movement: normal. She is taking iron daily. She took her abx for vaginal infection and her discharge has resolved. She declines birth control pp. She is agreeable to the flu and Tdap vaccines today.     Review of Systems  No bleeding, No cramping/contractions     /72   Wt 72.6 kg (160 lb)   LMP 2022   BMI 31.25 kg/m²     FHT: 140 BPM   Uterine Size: 26  cm       Assessment/Plan:    1) 27 y.o.  -pregnancy at 27w1d- NIPS neg. AFP neg.     2) Patient advised to have the Tdap shot in the later part of pregnancy to help protect against whooping cough.  Also advised that FOB and other adults who come in contact with the infant should also be vaccinated with Tdap.  Vaccine given today.    3) Patient advised to have flu vaccination to help prevent serious flu related complications for her and her unborn child.  The importance of antibodies for  immunity also discussed with patient and she does agree to vaccination today.   All household contacts were advised to be vaccinated if they are of age. The patient was also encouraged to call for Tamiflu for positive exposures.     4) Short interval between pregnancy- CL 5.45cm on 22.      5) H/O Oligohydramnios- Delivery note indicates IOL @ 37 3/7 week d/t oligohydramnios. S<D today, will check US growth next visit.      6) Tanzanian speaking-  used      7)  Mild anemia: Hgb 11.7g/dL. Taking iron. check H&H next visit.      9) Abnormal NuSwab: + ureaplasma, mycoplasma genitalium and BV.S/P Abx, denies discharge     10)CF/SMA/FX declined.     11)Reviewed this stage of pregnancy    12)Problem list updated     13) RTO 2 weeks OBT, 2 hour GTT, RH+, US growth (S<D and h/o oligo in last pregnancy)       Candie Dejesus MD    10/17/2022  09:50 EDT

## 2022-11-01 ENCOUNTER — ROUTINE PRENATAL (OUTPATIENT)
Dept: OBSTETRICS AND GYNECOLOGY | Facility: CLINIC | Age: 27
End: 2022-11-01

## 2022-11-01 VITALS — SYSTOLIC BLOOD PRESSURE: 108 MMHG | DIASTOLIC BLOOD PRESSURE: 74 MMHG | WEIGHT: 159 LBS | BODY MASS INDEX: 31.05 KG/M2

## 2022-11-01 DIAGNOSIS — O09.299 HISTORY OF OLIGOHYDRAMNIOS IN PRIOR PREGNANCY, CURRENTLY PREGNANT: ICD-10-CM

## 2022-11-01 DIAGNOSIS — Z34.93 PRENATAL CARE IN THIRD TRIMESTER: ICD-10-CM

## 2022-11-01 DIAGNOSIS — Z78.9 USES SPANISH AS PRIMARY SPOKEN LANGUAGE: ICD-10-CM

## 2022-11-01 DIAGNOSIS — Z36.9 ENCOUNTER FOR ANTENATAL SCREENING, UNSPECIFIED: Primary | ICD-10-CM

## 2022-11-01 PROBLEM — Z23 NEED FOR TDAP VACCINATION: Status: RESOLVED | Noted: 2022-10-17 | Resolved: 2022-11-01

## 2022-11-01 PROBLEM — Z23 NEEDS FLU SHOT: Status: RESOLVED | Noted: 2022-10-17 | Resolved: 2022-11-01

## 2022-11-01 PROBLEM — Z3A.29 29 WEEKS GESTATION OF PREGNANCY: Status: ACTIVE | Noted: 2022-11-01

## 2022-11-01 LAB
GLUCOSE UR STRIP-MCNC: NEGATIVE MG/DL
PROT UR STRIP-MCNC: NEGATIVE MG/DL

## 2022-11-01 PROCEDURE — 0502F SUBSEQUENT PRENATAL CARE: CPT | Performed by: STUDENT IN AN ORGANIZED HEALTH CARE EDUCATION/TRAINING PROGRAM

## 2022-11-01 NOTE — PROGRESS NOTES
OB follow up     Vi Pearl is a 27 y.o.  29+ being seen today for her obstetrical visit.  Patient reports no complaints. Fetal movement: normal. She is taking iron daily. Had her growth scan today and is finishing her 2hr gtt.     Review of Systems  No bleeding, No cramping/contractions     /74   Wt 72.1 kg (159 lb)   LMP 2022   BMI 31.05 kg/m²   Vitals: VSS; AF    General Appearance:  Awake. Alert. Well developed. Well nourished. In no acute distress.    Visual Inspection: ° Abdomen was normal on visual inspection.  Palpation: ° Abdomen was soft. ° Abdominal non-tender.    Uterus: ° Fundal height was normal for gestational age. ° Not tender.  Uterine Adnexae: ° Normal without masses or tenderness.  Neurological:  ° Oriented to time, place, and person.  Skin:  ° General appearance was normal. No bruising or ecchymosis.  Obstetrical:+FM and FCA       Assessment/Plan:    1) 27 y.o.  -pregnancy at 29+- NIPS neg. AFP neg.     Presentation: Breech  Placenta: Anterior  YANG: 11.61  FCA: 130's  Cervical length: 3.94cm  EFW  1206g 35.1%     2hr gtt and H/H pending     2) Tdap vaccine received    3) Flu vaccine received     4) Short interval between pregnancy- CL 5.45cm on 22.      5) H/O Oligohydramnios- Delivery note indicates IOL @ 37 3/7 week d/t oligohydramnios. S<D today, will check US growth next visit.   YANG 11.61cm 1Nov22     6) French speaking-  used      7)  Mild anemia: Hgb 11.7g/dL. Taking iron.    H&H pending      9) Abnormal NuSwab: + ureaplasma, mycoplasma genitalium and BV.S/P Abx, denies discharge  RESOLVED      10)CF/SMA/FX declined.     11)Reviewed this stage of pregnancy    12)Problem list updated     13) RTO 3 weeks ROBERTO Don DO    2022  10:01 EDT

## 2022-11-02 LAB
GLUCOSE 1H P 75 G GLC PO SERPL-MCNC: 135 MG/DL (ref 65–179)
GLUCOSE 2H P 75 G GLC PO SERPL-MCNC: 102 MG/DL (ref 65–154)
GLUCOSE P FAST SERPL-MCNC: 71 MG/DL (ref 65–94)
HCT VFR BLD AUTO: 36 % (ref 34–46.6)
HGB BLD-MCNC: 12 G/DL (ref 12–15.9)

## 2022-11-21 ENCOUNTER — ROUTINE PRENATAL (OUTPATIENT)
Dept: OBSTETRICS AND GYNECOLOGY | Facility: CLINIC | Age: 27
End: 2022-11-21

## 2022-11-21 VITALS — WEIGHT: 161 LBS | DIASTOLIC BLOOD PRESSURE: 70 MMHG | SYSTOLIC BLOOD PRESSURE: 118 MMHG | BODY MASS INDEX: 31.44 KG/M2

## 2022-11-21 DIAGNOSIS — Z78.9 USES SPANISH AS PRIMARY SPOKEN LANGUAGE: ICD-10-CM

## 2022-11-21 DIAGNOSIS — O09.299 HISTORY OF OLIGOHYDRAMNIOS IN PRIOR PREGNANCY, CURRENTLY PREGNANT: ICD-10-CM

## 2022-11-21 DIAGNOSIS — Z34.93 PRENATAL CARE IN THIRD TRIMESTER: Primary | ICD-10-CM

## 2022-11-21 LAB
GLUCOSE UR STRIP-MCNC: NEGATIVE MG/DL
PROT UR STRIP-MCNC: ABNORMAL MG/DL

## 2022-11-21 PROCEDURE — 0502F SUBSEQUENT PRENATAL CARE: CPT | Performed by: NURSE PRACTITIONER

## 2022-11-21 NOTE — PROGRESS NOTES
OB follow up     Vi Pearl is a 27 y.o.  32w1d being seen today for her obstetrical visit.  Patient reports no complaints. Fetal movement: normal. She is taking iron daily.      Review of Systems  No bleeding, No cramping/contractions     /70   Wt 73 kg (161 lb)   LMP 2022   BMI 31.44 kg/m²     FHT: 130s  BPM   Uterine Size: 32 cm       Assessment/Plan:    1) 27 y.o.  -pregnancy at 32w1d- NIPS neg. AFP neg.     2) S/P Tdap vaccine.    3) S/P flu vaccine      4) Short interval between pregnancy- CL 5.45cm on 22.      5) H/O Oligohydramnios- Delivery note indicates IOL @ 37 3/7 week d/t oligohydramnios. YANG 11cm and Growth 35% @ 30 weeks.      6) Samoan speaking-  used      7)  Mild anemia: Hgb 12.0g/dL. Continue iron.       9) Abnormal NuSwab: + ureaplasma, mycoplasma genitalium and BV.S/P Abx, denies discharge     10)CF/SMA/FX declined.     Reviewed this stage of pregnancy  Problem list updated   RTO 2 weeks OBT and US growth (S<D and h/o oligo in last pregnancy)       Megan Drake, ADRI  2022  11:36 EST

## 2022-12-05 ENCOUNTER — ROUTINE PRENATAL (OUTPATIENT)
Dept: OBSTETRICS AND GYNECOLOGY | Facility: CLINIC | Age: 27
End: 2022-12-05

## 2022-12-05 VITALS — BODY MASS INDEX: 31.83 KG/M2 | SYSTOLIC BLOOD PRESSURE: 120 MMHG | WEIGHT: 163 LBS | DIASTOLIC BLOOD PRESSURE: 88 MMHG

## 2022-12-05 DIAGNOSIS — Z36.9 ENCOUNTER FOR ANTENATAL SCREENING, UNSPECIFIED: Primary | ICD-10-CM

## 2022-12-05 DIAGNOSIS — O09.299 HISTORY OF OLIGOHYDRAMNIOS IN PRIOR PREGNANCY, CURRENTLY PREGNANT: ICD-10-CM

## 2022-12-05 DIAGNOSIS — D50.8 OTHER IRON DEFICIENCY ANEMIA: ICD-10-CM

## 2022-12-05 DIAGNOSIS — Z34.93 PRENATAL CARE IN THIRD TRIMESTER: ICD-10-CM

## 2022-12-05 DIAGNOSIS — O26.849 FUNDAL HEIGHT LOW FOR DATES, ANTEPARTUM: ICD-10-CM

## 2022-12-05 DIAGNOSIS — Z78.9 USES SPANISH AS PRIMARY SPOKEN LANGUAGE: ICD-10-CM

## 2022-12-05 LAB
GLUCOSE UR STRIP-MCNC: NEGATIVE MG/DL
PROT UR STRIP-MCNC: ABNORMAL MG/DL

## 2022-12-05 PROCEDURE — 0502F SUBSEQUENT PRENATAL CARE: CPT | Performed by: STUDENT IN AN ORGANIZED HEALTH CARE EDUCATION/TRAINING PROGRAM

## 2022-12-05 NOTE — PROGRESS NOTES
OB follow up     Vi Pearl is a 27 y.o.  34+1 being seen today for her obstetrical visit.  Patient reports no complaints. Fetal movement: normal. She is taking iron daily. Growth scan today       Review of Systems  No bleeding, No cramping/contractions     /88   Wt 73.9 kg (163 lb)   LMP 2022   BMI 31.83 kg/m²   Vitals: VSS; AF    General Appearance:  Awake. Alert. Well developed. Well nourished. In no acute distress.    Visual Inspection: ° Abdomen was normal on visual inspection.  Palpation: ° Abdomen was soft. ° Abdominal non-tender.    Uterus: ° Fundal height was normal for gestational age. ° Not tender.  Uterine Adnexae: ° Normal without masses or tenderness.  Neurological:  ° Oriented to time, place, and person.  Skin:  ° General appearance was normal. No bruising or ecchymosis.  Obstetrical:+FM and FCA       Assessment/Plan:    1) 27 y.o.  -pregnancy at 34+ NIPS neg. AFP neg.     Presentation: Breech  Placenta: Anterior  YANG: 11.61  FCA: 130's  Cervical length: 3.94cm  EFW  1206g 35.1% Last Growth     Growth today     Presentation: Breech  Placenta: Anterior  YANG: 7.66cm  FCA: 140's  Cervical length:   EFW  1960g 18.4%    BPP 8/8     2hr gtt and H/H Normal    2) Tdap vaccine received    3) Flu vaccine received     4) Short interval between pregnancy- CL 5.45cm on 22.      5) H/O Oligohydramnios- Delivery note indicates IOL @ 37 3/7 week d/t oligohydramnios. S<D today, will check US growth again at 38wga   Today 18%        6) Polish speaking-  used      7)  Mild anemia: Hgb 11.7g/dL. Taking iron.    H&H pending      8) Abnormal NuSwab: + ureaplasma, mycoplasma genitalium and BV.S/P Abx, denies discharge  RESOLVED      9)CF/SMA/FX declined.     10)Reviewed this stage of pregnancy    11)Problem list updated     12) RTO 2 weeks ROBERTO      I spent 30 minutes caring for Vi on this date of service. This time includes time spent by me in the following  activities: preparing for the visit, reviewing tests, obtaining and/or reviewing a separately obtained history, performing a medically appropriate examination and/or evaluation, counseling and educating the patient/family/caregiver, ordering medications, tests, or procedures, documenting information in the medical record, independently interpreting results and communicating that information with the patient/family/caregiver and care coordination         Jasper Don DO    12/5/2022  11:18 EST

## 2022-12-19 ENCOUNTER — ROUTINE PRENATAL (OUTPATIENT)
Dept: OBSTETRICS AND GYNECOLOGY | Facility: CLINIC | Age: 27
End: 2022-12-19

## 2022-12-19 VITALS — BODY MASS INDEX: 32.61 KG/M2 | WEIGHT: 167 LBS | DIASTOLIC BLOOD PRESSURE: 74 MMHG | SYSTOLIC BLOOD PRESSURE: 112 MMHG

## 2022-12-19 DIAGNOSIS — O28.8 AFI (AMNIOTIC FLUID INDEX) BORDERLINE LOW: ICD-10-CM

## 2022-12-19 DIAGNOSIS — Z36.85 ANTENATAL SCREENING FOR STREPTOCOCCUS B: Primary | ICD-10-CM

## 2022-12-19 DIAGNOSIS — Z34.93 PRENATAL CARE IN THIRD TRIMESTER: ICD-10-CM

## 2022-12-19 DIAGNOSIS — O99.019 MATERNAL ANEMIA IN PREGNANCY, ANTEPARTUM: ICD-10-CM

## 2022-12-19 LAB
BASOPHILS # BLD AUTO: 0.03 10*3/MM3 (ref 0–0.2)
BASOPHILS NFR BLD AUTO: 0.6 % (ref 0–1.5)
EOSINOPHIL # BLD AUTO: 0.08 10*3/MM3 (ref 0–0.4)
EOSINOPHIL NFR BLD AUTO: 1.6 % (ref 0.3–6.2)
ERYTHROCYTE [DISTWIDTH] IN BLOOD BY AUTOMATED COUNT: 12.7 % (ref 12.3–15.4)
GLUCOSE UR STRIP-MCNC: NEGATIVE MG/DL
HCT VFR BLD AUTO: 36.7 % (ref 34–46.6)
HGB BLD-MCNC: 11.8 G/DL (ref 12–15.9)
IMM GRANULOCYTES # BLD AUTO: 0.02 10*3/MM3 (ref 0–0.05)
IMM GRANULOCYTES NFR BLD AUTO: 0.4 % (ref 0–0.5)
LYMPHOCYTES # BLD AUTO: 1.19 10*3/MM3 (ref 0.7–3.1)
LYMPHOCYTES NFR BLD AUTO: 23.9 % (ref 19.6–45.3)
MCH RBC QN AUTO: 29.7 PG (ref 26.6–33)
MCHC RBC AUTO-ENTMCNC: 32.2 G/DL (ref 31.5–35.7)
MCV RBC AUTO: 92.4 FL (ref 79–97)
MONOCYTES # BLD AUTO: 0.45 10*3/MM3 (ref 0.1–0.9)
MONOCYTES NFR BLD AUTO: 9.1 % (ref 5–12)
NEUTROPHILS # BLD AUTO: 3.2 10*3/MM3 (ref 1.7–7)
NEUTROPHILS NFR BLD AUTO: 64.4 % (ref 42.7–76)
NRBC BLD AUTO-RTO: 0 /100 WBC (ref 0–0.2)
PLATELET # BLD AUTO: 275 10*3/MM3 (ref 140–450)
PROT UR STRIP-MCNC: NEGATIVE MG/DL
RBC # BLD AUTO: 3.97 10*6/MM3 (ref 3.77–5.28)
WBC # BLD AUTO: 4.97 10*3/MM3 (ref 3.4–10.8)

## 2022-12-19 PROCEDURE — 0502F SUBSEQUENT PRENATAL CARE: CPT | Performed by: NURSE PRACTITIONER

## 2022-12-19 NOTE — PROGRESS NOTES
OB follow up     Vi Pearl is a 27 y.o.  36w1d being seen today for her obstetrical visit.  Patient reports no complaints. Fetal movement: normal. She is taking iron daily. She is accompanied by her partner today.      Review of Systems  No bleeding, No cramping/contractions     /74   Wt 75.8 kg (167 lb)   LMP 2022   BMI 32.61 kg/m²     FHT: 140s   BPM   Uterine Size: 33 cm     SVE: 0.5/thick/OOP     Assessment/Plan:    1) 27 y.o.  -pregnancy at 36w1d- GBS collected today.     2) S/P Tdap vaccine    3) S/P flu vaccine      4) Short interval between pregnancy- CL 5.45cm on 22.      5) H/O Oligohydramnios- Delivery note indicates IOL @ 37 3/7 week d/t oligohydramnios. YANG 11cm and Growth 35% @ 30 weeks. Growth 18% and 7.66cm @ 34 weeks.      6) Hebrew speaking-  used      7)  Mild anemia: Hgb 12.0g/dL. Continue iron.  Check CBC today.      9) Abnormal NuSwab: Resolved.      10) CF/SMA/FX declined.    11) Transverse- Fetal head to maternal (R). Verified by BS US today.     12) Borderline low YANG- YANG 7.66cm. last week. Recheck YANG today.      13) S<D- Growth 18% @ 34 weeks     Reviewed this stage of pregnancy  Problem list updated   RTO 1 weeks OBT       ADRI Jimenez  2022  11:21 EST

## 2022-12-20 ENCOUNTER — HOSPITAL ENCOUNTER (INPATIENT)
Facility: HOSPITAL | Age: 27
LOS: 1 days | Discharge: HOME | End: 2022-12-22
Attending: OBSTETRICS & GYNECOLOGY | Admitting: OBSTETRICS & GYNECOLOGY
Payer: COMMERCIAL

## 2022-12-20 LAB
A1 MICROGLOB PLACENTAL VAG QL: NEGATIVE
ABO GROUP BLD: NORMAL
AMPHET+METHAMPHET UR QL: NEGATIVE
AMPHETAMINES UR QL: NEGATIVE
BACTERIA UR QL AUTO: ABNORMAL /HPF
BARBITURATES UR QL SCN: NEGATIVE
BENZODIAZ UR QL SCN: NEGATIVE
BILIRUB UR QL STRIP: NEGATIVE
BLD GP AB SCN SERPL QL: NEGATIVE
BUPRENORPHINE SERPL-MCNC: NEGATIVE NG/ML
CANNABINOIDS SERPL QL: NEGATIVE
CLARITY UR: CLEAR
COCAINE UR QL: NEGATIVE
COLOR UR: YELLOW
DEPRECATED RDW RBC AUTO: 42.4 FL (ref 37–54)
ERYTHROCYTE [DISTWIDTH] IN BLOOD BY AUTOMATED COUNT: 12.7 % (ref 12.3–15.4)
GLUCOSE UR STRIP-MCNC: NEGATIVE MG/DL
HCT VFR BLD AUTO: 36.4 % (ref 34–46.6)
HGB BLD-MCNC: 12.1 G/DL (ref 12–15.9)
HGB UR QL STRIP.AUTO: NEGATIVE
HYALINE CASTS UR QL AUTO: ABNORMAL /LPF
KETONES UR QL STRIP: NEGATIVE
LEUKOCYTE ESTERASE UR QL STRIP.AUTO: ABNORMAL
MCH RBC QN AUTO: 30.6 PG (ref 26.6–33)
MCHC RBC AUTO-ENTMCNC: 33.2 G/DL (ref 31.5–35.7)
MCV RBC AUTO: 92.2 FL (ref 79–97)
METHADONE UR QL SCN: NEGATIVE
NITRITE UR QL STRIP: NEGATIVE
OPIATES UR QL: NEGATIVE
OXYCODONE UR QL SCN: NEGATIVE
PCP UR QL SCN: NEGATIVE
PH UR STRIP.AUTO: 6 [PH] (ref 4.5–8)
PLATELET # BLD AUTO: 262 10*3/MM3 (ref 140–450)
PMV BLD AUTO: 10.7 FL (ref 6–12)
PROPOXYPH UR QL: NEGATIVE
PROT UR QL STRIP: NEGATIVE
RBC # BLD AUTO: 3.95 10*6/MM3 (ref 3.77–5.28)
RBC # UR STRIP: ABNORMAL /HPF
REF LAB TEST METHOD: ABNORMAL
RH BLD: POSITIVE
SP GR UR STRIP: 1.02 (ref 1–1.03)
SQUAMOUS #/AREA URNS HPF: ABNORMAL /HPF
T&S EXPIRATION DATE: NORMAL
TRICYCLICS UR QL SCN: NEGATIVE
UROBILINOGEN UR QL STRIP: ABNORMAL
WBC # UR STRIP: ABNORMAL /HPF
WBC NRBC COR # BLD: 4.96 10*3/MM3 (ref 3.4–10.8)

## 2022-12-20 PROCEDURE — 84112 EVAL AMNIOTIC FLUID PROTEIN: CPT | Performed by: OBSTETRICS & GYNECOLOGY

## 2022-12-20 PROCEDURE — 25010000002 BETAMETHASONE ACET & SOD PHOS PER 4 MG: Performed by: OBSTETRICS & GYNECOLOGY

## 2022-12-20 PROCEDURE — 86850 RBC ANTIBODY SCREEN: CPT | Performed by: OBSTETRICS & GYNECOLOGY

## 2022-12-20 PROCEDURE — 85027 COMPLETE CBC AUTOMATED: CPT | Performed by: OBSTETRICS & GYNECOLOGY

## 2022-12-20 PROCEDURE — 81001 URINALYSIS AUTO W/SCOPE: CPT | Performed by: OBSTETRICS & GYNECOLOGY

## 2022-12-20 PROCEDURE — 86900 BLOOD TYPING SEROLOGIC ABO: CPT | Performed by: OBSTETRICS & GYNECOLOGY

## 2022-12-20 PROCEDURE — 99214 OFFICE O/P EST MOD 30 MIN: CPT | Performed by: STUDENT IN AN ORGANIZED HEALTH CARE EDUCATION/TRAINING PROGRAM

## 2022-12-20 PROCEDURE — 80306 DRUG TEST PRSMV INSTRMNT: CPT | Performed by: OBSTETRICS & GYNECOLOGY

## 2022-12-20 PROCEDURE — 86901 BLOOD TYPING SEROLOGIC RH(D): CPT | Performed by: OBSTETRICS & GYNECOLOGY

## 2022-12-20 RX ORDER — SODIUM CHLORIDE, SODIUM LACTATE, POTASSIUM CHLORIDE, CALCIUM CHLORIDE 600; 310; 30; 20 MG/100ML; MG/100ML; MG/100ML; MG/100ML
125 INJECTION, SOLUTION INTRAVENOUS CONTINUOUS
Status: DISCONTINUED | OUTPATIENT
Start: 2022-12-20 | End: 2022-12-22 | Stop reason: HOSPADM

## 2022-12-20 RX ORDER — BETAMETHASONE SODIUM PHOSPHATE AND BETAMETHASONE ACETATE 3; 3 MG/ML; MG/ML
12 INJECTION, SUSPENSION INTRA-ARTICULAR; INTRALESIONAL; INTRAMUSCULAR; SOFT TISSUE EVERY 24 HOURS
Status: DISPENSED | OUTPATIENT
Start: 2022-12-20 | End: 2022-12-22

## 2022-12-20 RX ADMIN — BETAMETHASONE ACETATE AND BETAMETHASONE SODIUM PHOSPHATE 12 MG: 3; 3 INJECTION, SUSPENSION INTRA-ARTICULAR; INTRALESIONAL; INTRAMUSCULAR; SOFT TISSUE at 09:32

## 2022-12-20 RX ADMIN — SODIUM CHLORIDE, POTASSIUM CHLORIDE, SODIUM LACTATE AND CALCIUM CHLORIDE 125 ML/HR: 600; 310; 30; 20 INJECTION, SOLUTION INTRAVENOUS at 11:30

## 2022-12-20 RX ADMIN — SODIUM CHLORIDE, POTASSIUM CHLORIDE, SODIUM LACTATE AND CALCIUM CHLORIDE 125 ML/HR: 600; 310; 30; 20 INJECTION, SOLUTION INTRAVENOUS at 18:19

## 2022-12-20 NOTE — PLAN OF CARE
Goal Outcome Evaluation:  Plan of Care Reviewed With: patient, significant other        Progress: improving  Outcome Evaluation: Vitals wnl. 23 hr observation for low YANG on contineous EFM/toco monitoring, see flowsheet for fht's and ctx's. Pt denies ctx's, leaking of fluid, and/or vaginal bleeding. Pt reports positive fetal movement. LR at 125 ml/hr as per ordered. SDC's on bilaterally. Pt tolerating regular diet. Pt to be NPO after midnight. Pt to have follow up US in office tomorrow, 12/21/22 at 0900. BMZ #2 due tomorrow 12/21/22 at 0932. Cont with plan of care. Update MD as needed with changes.

## 2022-12-20 NOTE — PROGRESS NOTES
Vi Pearl is a 27 y.o.  36w2d being sent from clinic to L&D for prolonged monitoring in the setting of Oligohydramnios and BMZ .    TAUS      Presentation: Breech  Placenta: Anterior  YANG: 3.79cm  FCA: 130's           Ultrasound images and report reviewed personally by me.    Full interpretation of ultrasound can be found in the patient's note on the same date of service.     Jasper Don, DO          Review of Systems  No bleeding, No cramping/contractions      /74   Wt 75.8 kg (167 lb)   LMP 2022   BMI 32.61 kg/m²      Vitals: VSS; AF    General Appearance:  Awake. Alert. Well developed. Well nourished. In no acute distress.    Visual Inspection: ° Abdomen was normal on visual inspection.  Palpation: ° Abdomen was soft. ° Abdominal non-tender.    Uterus: ° Fundal height was normal for gestational age. ° Not tender.  Uterine Adnexae: ° Normal without masses or tenderness.  Neurological:  ° Oriented to time, place, and person.  Skin:  ° General appearance was normal. No bruising or ecchymosis.       Assessment/Plan:     1) 27 y.o.  -pregnancy at 36w2d- GBS collected today.      2)  Short interval between pregnancy- CL 5.45cm on 22.      3) H/O Oligohydramnios- Delivery note indicates IOL @ 37 3/7 week d/t oligohydramnios. YANG 11cm and Growth 35% @ 30 weeks. Growth 18% and 7.66cm @ 34 weeks.     Today Oligohydramnios 3.79cm   Admit for observation to L&D triage for IVF, prolonged continuous monitoring , BMZ. Of note fetal malpresentation . ACOG recommend delivery  Oligo 36-36+7 ( , )     4) Fetal malpresentation   LTCS would be recommend ; ECV relative contraindication is Oligo       Slovenian speaking-  used      Observation antepartum   BMZ today ~0900 and tomoorow dose #2   US tomorrow for fluid and presentation

## 2022-12-21 ENCOUNTER — ANESTHESIA EVENT (OUTPATIENT)
Dept: OBSTETRICS AND GYNECOLOGY | Facility: HOSPITAL | Age: 27
End: 2022-12-21
Payer: COMMERCIAL

## 2022-12-21 ENCOUNTER — ANESTHESIA (OUTPATIENT)
Dept: OBSTETRICS AND GYNECOLOGY | Facility: HOSPITAL | Age: 27
End: 2022-12-21
Payer: COMMERCIAL

## 2022-12-21 PROBLEM — O99.820 GBS (GROUP B STREPTOCOCCUS CARRIER), +RV CULTURE, CURRENTLY PREGNANT: Status: ACTIVE | Noted: 2022-12-21

## 2022-12-21 LAB — GP B STREP DNA SPEC QL NAA+PROBE: POSITIVE

## 2022-12-21 PROCEDURE — 94799 UNLISTED PULMONARY SVC/PX: CPT

## 2022-12-21 PROCEDURE — 25010000002 AZITHROMYCIN PER 500 MG: Performed by: STUDENT IN AN ORGANIZED HEALTH CARE EDUCATION/TRAINING PROGRAM

## 2022-12-21 PROCEDURE — 25010000002 ONDANSETRON PER 1 MG: Performed by: NURSE ANESTHETIST, CERTIFIED REGISTERED

## 2022-12-21 PROCEDURE — 0 CEFAZOLIN SODIUM-DEXTROSE 2-3 GM-%(50ML) RECONSTITUTED SOLUTION: Performed by: STUDENT IN AN ORGANIZED HEALTH CARE EDUCATION/TRAINING PROGRAM

## 2022-12-21 PROCEDURE — 94761 N-INVAS EAR/PLS OXIMETRY MLT: CPT

## 2022-12-21 PROCEDURE — 25010000002 PROMETHAZINE PER 50 MG: Performed by: NURSE ANESTHETIST, CERTIFIED REGISTERED

## 2022-12-21 PROCEDURE — 25010000002 KETOROLAC TROMETHAMINE PER 15 MG: Performed by: STUDENT IN AN ORGANIZED HEALTH CARE EDUCATION/TRAINING PROGRAM

## 2022-12-21 PROCEDURE — 25010000002 TERBUTALINE PER 1 MG: Performed by: STUDENT IN AN ORGANIZED HEALTH CARE EDUCATION/TRAINING PROGRAM

## 2022-12-21 PROCEDURE — 59514 CESAREAN DELIVERY ONLY: CPT | Performed by: SPECIALIST/TECHNOLOGIST, OTHER

## 2022-12-21 PROCEDURE — 25010000002 MORPHINE PER 10 MG: Performed by: NURSE ANESTHETIST, CERTIFIED REGISTERED

## 2022-12-21 PROCEDURE — 25010000002 FENTANYL CITRATE (PF) 100 MCG/2ML SOLUTION: Performed by: NURSE ANESTHETIST, CERTIFIED REGISTERED

## 2022-12-21 PROCEDURE — 88307 TISSUE EXAM BY PATHOLOGIST: CPT

## 2022-12-21 RX ORDER — DIPHENHYDRAMINE HYDROCHLORIDE 50 MG/ML
25 INJECTION INTRAMUSCULAR; INTRAVENOUS EVERY 4 HOURS PRN
Status: DISCONTINUED | OUTPATIENT
Start: 2022-12-21 | End: 2022-12-22 | Stop reason: HOSPADM

## 2022-12-21 RX ORDER — ACETAMINOPHEN 500 MG
1000 TABLET ORAL EVERY 6 HOURS
Status: COMPLETED | OUTPATIENT
Start: 2022-12-21 | End: 2022-12-22

## 2022-12-21 RX ORDER — AZITHROMYCIN 500 MG/1
INJECTION, POWDER, LYOPHILIZED, FOR SOLUTION INTRAVENOUS
Status: DISPENSED
Start: 2022-12-21 | End: 2022-12-21

## 2022-12-21 RX ORDER — ONDANSETRON 4 MG/1
4 TABLET, FILM COATED ORAL EVERY 8 HOURS PRN
Status: DISCONTINUED | OUTPATIENT
Start: 2022-12-21 | End: 2022-12-22 | Stop reason: HOSPADM

## 2022-12-21 RX ORDER — DIPHENHYDRAMINE HCL 25 MG
25 CAPSULE ORAL EVERY 4 HOURS PRN
Status: DISCONTINUED | OUTPATIENT
Start: 2022-12-21 | End: 2022-12-22 | Stop reason: HOSPADM

## 2022-12-21 RX ORDER — DOCUSATE SODIUM 100 MG/1
100 CAPSULE, LIQUID FILLED ORAL 2 TIMES DAILY PRN
Status: DISCONTINUED | OUTPATIENT
Start: 2022-12-21 | End: 2022-12-22 | Stop reason: HOSPADM

## 2022-12-21 RX ORDER — OXYCODONE HYDROCHLORIDE 5 MG/1
10 TABLET ORAL EVERY 4 HOURS PRN
Status: DISCONTINUED | OUTPATIENT
Start: 2022-12-21 | End: 2022-12-22 | Stop reason: HOSPADM

## 2022-12-21 RX ORDER — FAMOTIDINE 10 MG/ML
INJECTION, SOLUTION INTRAVENOUS AS NEEDED
Status: DISCONTINUED | OUTPATIENT
Start: 2022-12-21 | End: 2022-12-21 | Stop reason: SURG

## 2022-12-21 RX ORDER — KETOROLAC TROMETHAMINE 30 MG/ML
15 INJECTION, SOLUTION INTRAMUSCULAR; INTRAVENOUS EVERY 6 HOURS
Status: DISPENSED | OUTPATIENT
Start: 2022-12-21 | End: 2022-12-22

## 2022-12-21 RX ORDER — ACETAMINOPHEN 325 MG/1
650 TABLET ORAL EVERY 6 HOURS
Status: DISCONTINUED | OUTPATIENT
Start: 2022-12-22 | End: 2022-12-22 | Stop reason: HOSPADM

## 2022-12-21 RX ORDER — CEFAZOLIN SODIUM 2 G/50ML
SOLUTION INTRAVENOUS
Status: COMPLETED
Start: 2022-12-21 | End: 2022-12-21

## 2022-12-21 RX ORDER — BUPIVACAINE HYDROCHLORIDE 7.5 MG/ML
INJECTION, SOLUTION EPIDURAL; RETROBULBAR
Status: COMPLETED | OUTPATIENT
Start: 2022-12-21 | End: 2022-12-21

## 2022-12-21 RX ORDER — OXYCODONE HYDROCHLORIDE 5 MG/1
5 TABLET ORAL EVERY 4 HOURS PRN
Status: DISCONTINUED | OUTPATIENT
Start: 2022-12-21 | End: 2022-12-22 | Stop reason: HOSPADM

## 2022-12-21 RX ORDER — PRENATAL VIT/IRON FUM/FOLIC AC 27MG-0.8MG
1 TABLET ORAL DAILY
Status: DISCONTINUED | OUTPATIENT
Start: 2022-12-21 | End: 2022-12-22 | Stop reason: HOSPADM

## 2022-12-21 RX ORDER — PROMETHAZINE HYDROCHLORIDE 25 MG/ML
INJECTION, SOLUTION INTRAMUSCULAR; INTRAVENOUS
Status: DISPENSED
Start: 2022-12-21 | End: 2022-12-22

## 2022-12-21 RX ORDER — SODIUM CHLORIDE 9 MG/ML
INJECTION, SOLUTION INTRAVENOUS
Status: DISPENSED
Start: 2022-12-21 | End: 2022-12-21

## 2022-12-21 RX ORDER — SODIUM CHLORIDE 9 MG/ML
INJECTION, SOLUTION INTRAVENOUS
Status: DISPENSED
Start: 2022-12-21 | End: 2022-12-22

## 2022-12-21 RX ORDER — CALCIUM CARBONATE 200(500)MG
1 TABLET,CHEWABLE ORAL EVERY 4 HOURS PRN
Status: DISCONTINUED | OUTPATIENT
Start: 2022-12-21 | End: 2022-12-22 | Stop reason: HOSPADM

## 2022-12-21 RX ORDER — OXYTOCIN/0.9 % SODIUM CHLORIDE 30/500 ML
PLASTIC BAG, INJECTION (ML) INTRAVENOUS
Status: COMPLETED
Start: 2022-12-21 | End: 2022-12-21

## 2022-12-21 RX ORDER — OXYTOCIN/0.9 % SODIUM CHLORIDE 30/500 ML
PLASTIC BAG, INJECTION (ML) INTRAVENOUS CONTINUOUS PRN
Status: DISCONTINUED | OUTPATIENT
Start: 2022-12-21 | End: 2022-12-21 | Stop reason: SURG

## 2022-12-21 RX ORDER — IBUPROFEN 600 MG/1
600 TABLET ORAL EVERY 6 HOURS
Status: DISCONTINUED | OUTPATIENT
Start: 2022-12-22 | End: 2022-12-22 | Stop reason: HOSPADM

## 2022-12-21 RX ORDER — MORPHINE SULFATE 1 MG/ML
INJECTION, SOLUTION EPIDURAL; INTRATHECAL; INTRAVENOUS AS NEEDED
Status: DISCONTINUED | OUTPATIENT
Start: 2022-12-21 | End: 2022-12-21 | Stop reason: SURG

## 2022-12-21 RX ORDER — FENTANYL CITRATE 50 UG/ML
INJECTION, SOLUTION INTRAMUSCULAR; INTRAVENOUS AS NEEDED
Status: DISCONTINUED | OUTPATIENT
Start: 2022-12-21 | End: 2022-12-21 | Stop reason: SURG

## 2022-12-21 RX ORDER — CEFAZOLIN SODIUM 2 G/50ML
2 SOLUTION INTRAVENOUS ONCE
Status: COMPLETED | OUTPATIENT
Start: 2022-12-21 | End: 2022-12-21

## 2022-12-21 RX ORDER — ONDANSETRON 2 MG/ML
INJECTION INTRAMUSCULAR; INTRAVENOUS
Status: COMPLETED
Start: 2022-12-21 | End: 2022-12-21

## 2022-12-21 RX ORDER — OXYTOCIN/0.9 % SODIUM CHLORIDE 30/500 ML
250 PLASTIC BAG, INJECTION (ML) INTRAVENOUS CONTINUOUS
Status: ACTIVE | OUTPATIENT
Start: 2022-12-21 | End: 2022-12-21

## 2022-12-21 RX ORDER — ONDANSETRON 2 MG/ML
INJECTION INTRAMUSCULAR; INTRAVENOUS AS NEEDED
Status: DISCONTINUED | OUTPATIENT
Start: 2022-12-21 | End: 2022-12-21 | Stop reason: SURG

## 2022-12-21 RX ORDER — HYDROXYZINE HYDROCHLORIDE 25 MG/1
50 TABLET, FILM COATED ORAL NIGHTLY PRN
Status: DISCONTINUED | OUTPATIENT
Start: 2022-12-21 | End: 2022-12-22 | Stop reason: HOSPADM

## 2022-12-21 RX ORDER — TERBUTALINE SULFATE 1 MG/ML
0.25 INJECTION, SOLUTION SUBCUTANEOUS ONCE
Status: COMPLETED | OUTPATIENT
Start: 2022-12-21 | End: 2022-12-21

## 2022-12-21 RX ORDER — OXYTOCIN/0.9 % SODIUM CHLORIDE 30/500 ML
999 PLASTIC BAG, INJECTION (ML) INTRAVENOUS ONCE
Status: DISCONTINUED | OUTPATIENT
Start: 2022-12-21 | End: 2022-12-22 | Stop reason: HOSPADM

## 2022-12-21 RX ORDER — FAMOTIDINE 10 MG/ML
INJECTION, SOLUTION INTRAVENOUS
Status: COMPLETED
Start: 2022-12-21 | End: 2022-12-21

## 2022-12-21 RX ORDER — ONDANSETRON 2 MG/ML
4 INJECTION INTRAMUSCULAR; INTRAVENOUS ONCE AS NEEDED
Status: COMPLETED | OUTPATIENT
Start: 2022-12-21 | End: 2022-12-21

## 2022-12-21 RX ORDER — ALUMINA, MAGNESIA, AND SIMETHICONE 2400; 2400; 240 MG/30ML; MG/30ML; MG/30ML
15 SUSPENSION ORAL EVERY 4 HOURS PRN
Status: DISCONTINUED | OUTPATIENT
Start: 2022-12-21 | End: 2022-12-22 | Stop reason: HOSPADM

## 2022-12-21 RX ADMIN — OXYTOCIN-SODIUM CHLORIDE 0.9% IV SOLN 30 UNIT/500ML 125 ML/HR: 30-0.9/5 SOLUTION at 08:36

## 2022-12-21 RX ADMIN — SODIUM CHLORIDE, POTASSIUM CHLORIDE, SODIUM LACTATE AND CALCIUM CHLORIDE 125 ML/HR: 600; 310; 30; 20 INJECTION, SOLUTION INTRAVENOUS at 10:54

## 2022-12-21 RX ADMIN — SODIUM CHLORIDE, POTASSIUM CHLORIDE, SODIUM LACTATE AND CALCIUM CHLORIDE 125 ML/HR: 600; 310; 30; 20 INJECTION, SOLUTION INTRAVENOUS at 02:15

## 2022-12-21 RX ADMIN — ACETAMINOPHEN 1000 MG: 500 TABLET ORAL at 12:48

## 2022-12-21 RX ADMIN — ACETAMINOPHEN 1000 MG: 500 TABLET ORAL at 18:22

## 2022-12-21 RX ADMIN — FAMOTIDINE 20 MG: 10 INJECTION, SOLUTION INTRAVENOUS at 08:39

## 2022-12-21 RX ADMIN — FENTANYL CITRATE 10 MCG: 50 INJECTION, SOLUTION INTRAMUSCULAR; INTRAVENOUS at 08:27

## 2022-12-21 RX ADMIN — TERBUTALINE SULFATE 0.25 MG: 1 INJECTION, SOLUTION SUBCUTANEOUS at 05:19

## 2022-12-21 RX ADMIN — CEFAZOLIN SODIUM 2 G: 2 SOLUTION INTRAVENOUS at 08:30

## 2022-12-21 RX ADMIN — BUPIVACAINE HYDROCHLORIDE 1.4 ML: 7.5 INJECTION, SOLUTION EPIDURAL; RETROBULBAR at 08:27

## 2022-12-21 RX ADMIN — AZITHROMYCIN DIHYDRATE 500 MG: 500 INJECTION, POWDER, LYOPHILIZED, FOR SOLUTION INTRAVENOUS at 08:16

## 2022-12-21 RX ADMIN — ONDANSETRON 4 MG: 2 INJECTION INTRAMUSCULAR; INTRAVENOUS at 08:38

## 2022-12-21 RX ADMIN — SODIUM CHLORIDE, POTASSIUM CHLORIDE, SODIUM LACTATE AND CALCIUM CHLORIDE 125 ML/HR: 600; 310; 30; 20 INJECTION, SOLUTION INTRAVENOUS at 18:22

## 2022-12-21 RX ADMIN — ONDANSETRON 4 MG: 2 INJECTION INTRAMUSCULAR; INTRAVENOUS at 10:18

## 2022-12-21 RX ADMIN — KETOROLAC TROMETHAMINE 15 MG: 30 INJECTION, SOLUTION INTRAMUSCULAR; INTRAVENOUS at 15:50

## 2022-12-21 RX ADMIN — MORPHINE SULFATE 0.15 MG: 1 INJECTION, SOLUTION EPIDURAL; INTRATHECAL; INTRAVENOUS at 08:27

## 2022-12-21 RX ADMIN — KETOROLAC TROMETHAMINE 15 MG: 30 INJECTION, SOLUTION INTRAMUSCULAR; INTRAVENOUS at 21:51

## 2022-12-21 RX ADMIN — PROMETHAZINE HYDROCHLORIDE 12.5 MG: 25 INJECTION INTRAMUSCULAR; INTRAVENOUS at 12:14

## 2022-12-21 RX ADMIN — KETOROLAC TROMETHAMINE 15 MG: 30 INJECTION, SOLUTION INTRAMUSCULAR; INTRAVENOUS at 09:41

## 2022-12-21 NOTE — PLAN OF CARE
Problem: Adult Inpatient Plan of Care  Goal: Plan of Care Review  Outcome: Ongoing, Progressing  Flowsheets (Taken 12/21/2022 1507)  Progress: improving  Plan of Care Reviewed With:   patient   spouse  Outcome Evaluation: Fundus and lochia wnl, pain well controlled with ordered meds.     Problem: Adult Inpatient Plan of Care  Goal: Patient-Specific Goal (Individualized)  Outcome: Ongoing, Progressing  Flowsheets (Taken 12/21/2022 1507)  Individualized Care Needs: Pt prefers to wear zacky bessy instead of gown.   Goal Outcome Evaluation:  Plan of Care Reviewed With: patient, spouse        Progress: improving  Outcome Evaluation: Fundus and lochia wnl, pain well controlled with ordered meds.

## 2022-12-21 NOTE — ADDENDUM NOTE
Addendum  created 12/21/22 1203 by Aditya Ash CRNA    Order list changed, Pharmacy for encounter modified

## 2022-12-21 NOTE — ANESTHESIA POSTPROCEDURE EVALUATION
Patient: Vi Pearl    Procedure Summary     Date: 22 Room / Location: [facility] LABOR DELIVERY   [facility]    Anesthesia Start: 821 Anesthesia Stop: 907    Procedure:  SECTION PRIMARY (Abdomen) Diagnosis: (breech, oligohydramnios)    Surgeons: Jasper Don DO Provider: Aditya Ash CRNA    Anesthesia Type: spinal, ITN ASA Status: 2 - Emergent          Anesthesia Type: spinal, ITN    Vitals  No vitals data found for the desired time range.          Post Anesthesia Care and Evaluation    Patient location during evaluation: bedside  Patient participation: complete - patient participated  Level of consciousness: awake and alert  Pain score: 0  Pain management: adequate    Airway patency: patent  Anesthetic complications: No anesthetic complications  PONV Status: none  Cardiovascular status: acceptable  Respiratory status: acceptable  Hydration status: acceptable

## 2022-12-21 NOTE — OP NOTE
[facility]   Section Operative Note    Pre-Operative Dx:   1) 27 y.o.  @ 36+3 in labor with breech presentation in the setting of Oligohydramnios  2) IUP at 36+3  3) Indications for  section: Labor, Breech         Postoperative dx:     Same, plus:     Procedure:  LTCS    Surgeon:   Jasper Don    Assistant: Susana Hanson CFA    Anesthesia:     EBL:   mls.  500 mls.         IV Fluids: 250 mls.   UOP: 150 mls.    I/O this shift:  In: 300 [IV Piggyback:300]  Out: -    Antibiotics: cefazolin (Ancef) and Zithromax     Infant:      Time of Delivery     Gender: male  infant    Weight: 2340 g (5 lb 2.5 oz)     Apgars:   @ 1 minute /       @ 5 minutes      Meconium:   Nuchal Cord:    no  no            Indication for C/Section: Breech in the setting of labor; Oligohydramnios                                      Procedure Details:       Procedure:  Under epidural anaesthetic with a calderón catheter inserted, the patient was prepped and draped in the usual sterile fashion in the supine position with a leftward tilt. A Pfannensteil incision was made through & the incision was carried down to the fascia with sharp dissection/cautery. The fascia was incised transversely and dissected off the rectus muscle using sharp dissection. Electrocautery was used for hemostasis. The peritoneum was opened taking care not to injure the bladder. The vesicouterine peritoneum was dissected off the lower uterine segment. The lower segment was assessed and a low transverse incision was made. The uterine incision was extended bluntly.     The fetus was presenting as a vertex. The head was delivered without difficulty and the rest of the body followed easily. The cord was clamped twice and cut and the baby transferred to the warmer, awaiting the nursing/pediatric staff. The placenta was then delivered spontaneously. The uterus was explored and was empty of all tissue. The uterus was exteriorized for better visualization. The  uterine incision was then closed in two layers with vicryl suture. The first layer was locking and the second layer was imbricating. Tubes and ovaries were examined and appeared normal. The peritoneum was closed in the standard fashion.   With the uterine closure complete and hemostasis achieved, attention was turned to the fascia. It was closed in a running unlocked fashion. The skin was then reapproximated with a running vicryl subcuticular suture and deep dermals placed x3. Dermal adhesive applied.     At the end of the procedure all sponges, instruments, and sharps were counted and correct. Estimated blood loss was 500 ml. The patient and baby were taken to the recovery in stable condition.             Complications:   None      Disposition:   Mother to Mother Baby/Postpartum  in stable condition currently.   Baby to NBN??  in [place] to decide condition currently.       Susana Hanson CFA   was responsible for performing the following activities: Retraction, Suction, Irrigation, Suturing and Closing and their skilled assistance was necessary for the success of this case.   Jasper Don, DO  12/21/2022  08:52 EST

## 2022-12-21 NOTE — PROGRESS NOTES
SVE with change in the setting of fetal malpresentation and Oligohydramnios. Recommend PLTCS and pt agrees. NICU called, Anesthesia aware      Counseling  The patient was counseled regarding the diagnosis and indications for  delivery and the procedure was explained in detail. We then discussed the risks, benefits and alternatives for  delivery. She was informed of the risks which include, but are not limited to, bleeding, need for blood transfusion with associated risk of transfusion reaction or transfusion-associated infection (CMV, HIV, viral hepatitis), need for hysterectomy as a life saving measure, damage to bowel, bladder or other internal organs requiring further surgery to repair or remove damaged organs, injury to the fetus (hypoxia, laceration, traumatic injury), infection of pelvic tissues or wound, wound separation, venous thrombosis, nerve damage, chronic pelvic pain, impaired fertility, need for  delivery in subsequent pregnancies, anesthetic complications and death. We discussed the alternative to  delivery, which is labor and attempted vaginal delivery and the risks that this would entail for her and her fetus. She verbalizes understanding of the above and consents to the proposed  delivery.

## 2022-12-21 NOTE — ANESTHESIA PREPROCEDURE EVALUATION
Anesthesia Evaluation     Patient summary reviewed and Nursing notes reviewed   NPO Solid Status: Waived due to emergency  NPO Liquid Status: Waived due to emergency           Airway   Dental      Pulmonary    Cardiovascular         Neuro/Psych  GI/Hepatic/Renal/Endo      Musculoskeletal     Abdominal    Substance History      OB/GYN          Other                        Anesthesia Plan    ASA 2 - emergent     spinal and ITN     (Emergent C/S and patient Setswana speaking only. No time to perform pre-op assessment)          CODE STATUS:    Level Of Support Discussed With: Patient  Code Status (Patient has no pulse and is not breathing): CPR (Attempt to Resuscitate)  Medical Interventions (Patient has pulse or is breathing): Full Support  Release to patient: Routine Release

## 2022-12-21 NOTE — ANESTHESIA PROCEDURE NOTES
Spinal Block      Patient reassessed immediately prior to procedure    Patient location during procedure: OB  Start Time: 12/21/2022 8:25 AM  Stop Time: 12/21/2022 8:27 AM  Indication:at surgeon's request and post-op pain management  Performed By  CRNA/CAA: Aditya Ash CRNA  Preanesthetic Checklist  Completed: patient identified, IV checked, site marked, risks and benefits discussed, surgical consent, monitors and equipment checked, pre-op evaluation and timeout performed  Spinal Block Prep:  Patient Position:sitting  Sterile Tech:cap, gloves, mask and sterile barriers  Prep:Chloraprep  Patient Monitoring:blood pressure monitoring, continuous pulse oximetry and EKG    Spinal Block Procedure  Approach:midline  Guidance:landmark technique and palpation technique  Location:L3-L4  Needle Type:Sprotte  Needle Gauge:25 G  Placement of Spinal needle event:cerebrospinal fluid aspirated  Paresthesia: no  Fluid Appearance:clear  Medications: bupivacaine PF (MARCAINE) injection 0.75% - Epidural   1.4 mL - 12/21/2022 8:27:00 AM   Post Assessment  Patient Tolerance:patient tolerated the procedure well with no apparent complications  Complications no

## 2022-12-21 NOTE — NURSING NOTE
Pt talking on the phone and smiling.  States her pain has improved and no longer breathing through contractions.

## 2022-12-21 NOTE — NURSING NOTE
Pt reported at 0445 that contractions became uncomfortable at 0300.  Rates pain 8/10.  SVE and phone update to Dr. Don. Explained through  phone plan of care with patient and fob, questions answered and pt verbalized understanding of medication and plan for the morning.

## 2022-12-21 NOTE — H&P
Patient Care Team:  Provider, No Known as PCP - General  Provider, No Known    Chief complaint Admiited for Observation        HPI: 26yo  with Oligo admitted from Clinic for observation. This am painful reg ctx with CVX change     ROS:   See HPI         PMH: History reviewed. No pertinent past medical history.      PSH:   Past Surgical History:   Procedure Laterality Date   • NO PAST SURGERIES         SoHx:   Social History     Socioeconomic History   • Marital status:    Tobacco Use   • Smoking status: Never   Vaping Use   • Vaping Use: Never used   Substance and Sexual Activity   • Alcohol use: Not Currently   • Drug use: Never   • Sexual activity: Yes     Partners: Male     Birth control/protection: None       FHx:   Family History   Problem Relation Age of Onset   • Breast cancer Neg Hx    • Ovarian cancer Neg Hx    • Uterine cancer Neg Hx    • Colon cancer Neg Hx    • Deep vein thrombosis Neg Hx    • Pulmonary embolism Neg Hx    • Birth defects Neg Hx    • Mental retardation Neg Hx            Allergies: Patient has no known allergies.    Medications:   No current facility-administered medications on file prior to encounter.     Current Outpatient Medications on File Prior to Encounter   Medication Sig Dispense Refill   • azithromycin (Zithromax) 500 MG tablet Take 2 tablets on day #1 followed by 1 tablet daily on day #2-4. 5 tablet 0   • ferrous gluconate (FERGON) 324 MG tablet Take 1 tablet by mouth 2 (Two) Times a Day. 60 tablet 2   • prenatal vitamin (prenatal, CLASSIC, vitamin) tablet Take 1 tablet by mouth Daily.               Vital Signs  Temp:  [98 °F (36.7 °C)-98.4 °F (36.9 °C)] 98.2 °F (36.8 °C)  Heart Rate:  [51-95] 95  Resp:  [16-18] 18  BP: (102-115)/(55-71) 112/59    Physical Exam:  Constitutional: She is oriented to person, place, and time. She appears well-developed and well-nourished.   HENT:   Head: Normocephalic and atraumatic.   Cardiovascular: Normal rate and regular rhythm.     Pulmonary/Chest: Effort normal. No respiratory distress.   Abdominal: Soft. Bowel sounds are normal. There is no tenderness. There is no rebound and no guarding.   Musculoskeletal: Normal range of motion.   Neurological: She is alert and oriented to person, place, and time. Painful ctx   Skin: Skin is warm and dry.   Psychiatric: She has a normal mood and affect. Her behavior is normal. Judgment and thought content normal.   Nursing note and vitals reviewed.  Debilities/Disabilities Identified: None  Emotional Behavior: Appropriate    Labs: 12.1/36.4       Assessment/Plan:   28 y/o  @36+3 PLTCS for fetal malpresentation in labor .   ANC - Breech, Oligo  Labs   -Admit to L&D  -Consent signed/on chart  -Anesthesia consult  -Ancef for SSI prophylaxis  -SCDs for VTE prophylaxis     Counseling  The patient was counseled regarding the diagnosis and indications for  delivery and the procedure was explained in detail. We then discussed the risks, benefits and alternatives for  delivery. She was informed of the risks which include, but are not limited to, bleeding, need for blood transfusion with associated risk of transfusion reaction or transfusion-associated infection (CMV, HIV, viral hepatitis), need for hysterectomy as a life saving measure, damage to bowel, bladder or other internal organs requiring further surgery to repair or remove damaged organs, injury to the fetus (hypoxia, laceration, traumatic injury), infection of pelvic tissues or wound, wound separation, venous thrombosis, nerve damage, chronic pelvic pain, impaired fertility, need for  delivery in subsequent pregnancies, anesthetic complications and death. We discussed the alternative to  delivery, which is labor and attempted vaginal delivery and the risks that this would entail for her and her fetus. She verbalizes understanding of the above and consents to the proposed  delivery.    I  discussed the patients findings and my recommendations with patient and family.         Jasper Don, DO  12/21/22  08:11 EST

## 2022-12-21 NOTE — PLAN OF CARE
Problem: Adult Inpatient Plan of Care  Goal: Plan of Care Review  Outcome: Ongoing, Progressing  Goal: Patient-Specific Goal (Individualized)  Outcome: Ongoing, Progressing  Goal: Absence of Hospital-Acquired Illness or Injury  Outcome: Ongoing, Progressing  Intervention: Identify and Manage Fall Risk  Recent Flowsheet Documentation  Taken 12/21/2022 1718 by Bouchra Franco, RN  Safety Promotion/Fall Prevention: safety round/check completed  Intervention: Prevent and Manage VTE (Venous Thromboembolism) Risk  Recent Flowsheet Documentation  Taken 12/21/2022 1718 by Bouchra Franco RN  Activity Management:   stepped/marched in place   standing at bedside  Goal: Optimal Comfort and Wellbeing  Outcome: Ongoing, Progressing  Goal: Readiness for Transition of Care  Outcome: Ongoing, Progressing   Goal Outcome Evaluation:

## 2022-12-22 VITALS
HEART RATE: 86 BPM | OXYGEN SATURATION: 96 % | TEMPERATURE: 98.4 F | SYSTOLIC BLOOD PRESSURE: 117 MMHG | RESPIRATION RATE: 16 BRPM | WEIGHT: 167 LBS | DIASTOLIC BLOOD PRESSURE: 71 MMHG | HEIGHT: 62 IN | BODY MASS INDEX: 30.73 KG/M2

## 2022-12-22 PROBLEM — Z34.93 PRENATAL CARE IN THIRD TRIMESTER: Status: RESOLVED | Noted: 2022-08-30 | Resolved: 2022-12-22

## 2022-12-22 PROBLEM — Z3A.29 29 WEEKS GESTATION OF PREGNANCY: Status: RESOLVED | Noted: 2022-11-01 | Resolved: 2022-12-22

## 2022-12-22 PROBLEM — Z36.85 ANTENATAL SCREENING FOR STREPTOCOCCUS B: Status: RESOLVED | Noted: 2022-12-19 | Resolved: 2022-12-22

## 2022-12-22 PROBLEM — D64.9 ANEMIA, UNSPECIFIED: Status: RESOLVED | Noted: 2022-10-17 | Resolved: 2022-12-22

## 2022-12-22 LAB
BASOPHILS # BLD AUTO: 0.02 10*3/MM3 (ref 0–0.2)
BASOPHILS NFR BLD AUTO: 0.3 % (ref 0–1.5)
DEPRECATED RDW RBC AUTO: 45.6 FL (ref 37–54)
EOSINOPHIL # BLD AUTO: 0.05 10*3/MM3 (ref 0–0.4)
EOSINOPHIL NFR BLD AUTO: 0.8 % (ref 0.3–6.2)
ERYTHROCYTE [DISTWIDTH] IN BLOOD BY AUTOMATED COUNT: 13.2 % (ref 12.3–15.4)
HCT VFR BLD AUTO: 28.1 % (ref 34–46.6)
HGB BLD-MCNC: 9.1 G/DL (ref 12–15.9)
IMM GRANULOCYTES # BLD AUTO: 0.01 10*3/MM3 (ref 0–0.05)
IMM GRANULOCYTES NFR BLD AUTO: 0.2 % (ref 0–0.5)
LYMPHOCYTES # BLD AUTO: 1.43 10*3/MM3 (ref 0.7–3.1)
LYMPHOCYTES NFR BLD AUTO: 22.1 % (ref 19.6–45.3)
MCH RBC QN AUTO: 30.8 PG (ref 26.6–33)
MCHC RBC AUTO-ENTMCNC: 32.4 G/DL (ref 31.5–35.7)
MCV RBC AUTO: 95.3 FL (ref 79–97)
MONOCYTES # BLD AUTO: 0.51 10*3/MM3 (ref 0.1–0.9)
MONOCYTES NFR BLD AUTO: 7.9 % (ref 5–12)
NEUTROPHILS NFR BLD AUTO: 4.45 10*3/MM3 (ref 1.7–7)
NEUTROPHILS NFR BLD AUTO: 68.7 % (ref 42.7–76)
NRBC BLD AUTO-RTO: 0 /100 WBC (ref 0–0.2)
PLATELET # BLD AUTO: 214 10*3/MM3 (ref 140–450)
PMV BLD AUTO: 10.5 FL (ref 6–12)
RBC # BLD AUTO: 2.95 10*6/MM3 (ref 3.77–5.28)
WBC NRBC COR # BLD: 6.47 10*3/MM3 (ref 3.4–10.8)

## 2022-12-22 PROCEDURE — 85025 COMPLETE CBC W/AUTO DIFF WBC: CPT | Performed by: STUDENT IN AN ORGANIZED HEALTH CARE EDUCATION/TRAINING PROGRAM

## 2022-12-22 RX ORDER — IBUPROFEN 600 MG/1
TABLET ORAL
Status: COMPLETED
Start: 2022-12-22 | End: 2022-12-22

## 2022-12-22 RX ORDER — IBUPROFEN 800 MG/1
800 TABLET ORAL EVERY 8 HOURS PRN
Qty: 30 TABLET | Refills: 0 | Status: SHIPPED | OUTPATIENT
Start: 2022-12-22

## 2022-12-22 RX ORDER — OXYCODONE HYDROCHLORIDE 5 MG/1
5 TABLET ORAL EVERY 4 HOURS PRN
Qty: 20 TABLET | Refills: 0 | Status: SHIPPED | OUTPATIENT
Start: 2022-12-22 | End: 2022-12-28

## 2022-12-22 RX ORDER — PSEUDOEPHEDRINE HCL 30 MG
100 TABLET ORAL 2 TIMES DAILY PRN
Qty: 30 EACH | Refills: 0 | Status: SHIPPED | OUTPATIENT
Start: 2022-12-22

## 2022-12-22 RX ADMIN — ACETAMINOPHEN 1000 MG: 500 TABLET ORAL at 06:46

## 2022-12-22 RX ADMIN — IBUPROFEN 600 MG: 600 TABLET ORAL at 04:02

## 2022-12-22 RX ADMIN — ACETAMINOPHEN 650 MG: 325 TABLET ORAL at 13:06

## 2022-12-22 RX ADMIN — IBUPROFEN 600 MG: 600 TABLET ORAL at 09:05

## 2022-12-22 RX ADMIN — ACETAMINOPHEN 1000 MG: 500 TABLET ORAL at 00:49

## 2022-12-22 RX ADMIN — OXYCODONE HYDROCHLORIDE 10 MG: 5 TABLET ORAL at 13:06

## 2022-12-22 RX ADMIN — OXYCODONE HYDROCHLORIDE 5 MG: 5 TABLET ORAL at 05:57

## 2022-12-22 RX ADMIN — PRENATAL VIT W/ FE FUMARATE-FA TAB 27-0.8 MG 1 TABLET: 27-0.8 TAB at 08:23

## 2022-12-22 NOTE — PROGRESS NOTES
Patient: Vi Pearl    @A@    Anesthesia Type: spinal, ITN  Patient location: Labor and Delivery  Last vitals  /71 (12/22/22 0914)    Temp 98.4 °F (36.9 °C) (12/22/22 0914)    Pulse 86 (12/22/22 0914)   Resp 16 (12/22/22 0914)    SpO2 96 % (12/22/22 0914)      Post vital signs: stable  Level of consciousness: awake, alert and oriented    Post-anesthesia pain: adequate analgesia  Airway patency: patent  Respiratory: unassisted  Cardiovascular: stable and blood pressure at baseline  Hydration: euvolemic    Difficult Airway: no  Anesthetic complications: no

## 2022-12-22 NOTE — PLAN OF CARE
Problem: Adult Inpatient Plan of Care  Goal: Plan of Care Review  Outcome: Met  Goal: Patient-Specific Goal (Individualized)  Outcome: Met  Goal: Absence of Hospital-Acquired Illness or Injury  Outcome: Met  Goal: Optimal Comfort and Wellbeing  Outcome: Met  Intervention: Monitor Pain and Promote Comfort  Recent Flowsheet Documentation  Taken 12/22/2022 0816 by Bouchra Franco, RN  Pain Management Interventions: no interventions per patient request  Intervention: Provide Person-Centered Care  Recent Flowsheet Documentation  Taken 12/22/2022 0816 by Bouchra Franco RN  Trust Relationship/Rapport:   care explained   choices provided  Goal: Readiness for Transition of Care  Outcome: Met   Goal Outcome Evaluation:

## 2022-12-22 NOTE — CASE MANAGEMENT/SOCIAL WORK
Case Management Discharge Note      Final Note: dc home         Selected Continued Care - Discharged on 12/22/2022 Admission date: 12/20/2022 - Discharge disposition: Home or Self Care    Destination    No services have been selected for the patient.              Durable Medical Equipment    No services have been selected for the patient.              Dialysis/Infusion    No services have been selected for the patient.              Home Medical Care    No services have been selected for the patient.              Therapy    No services have been selected for the patient.              Community Resources    No services have been selected for the patient.              Community & DME    No services have been selected for the patient.                       Final Discharge Disposition Code: 01 - home or self-care

## 2022-12-22 NOTE — DISCHARGE SUMMARY
Date of Discharge:  2022    Discharge Diagnosis:   S/p C/S    Problem List:     delivery delivered    Uses Djib[place]ian as primary spoken language    Maternal anemia in pregnancy, antepartum    Breech presentation    GBS (group B Streptococcus carrier), +RV culture, currently pregnant      Presenting Problem/History of Present Illness  Breech presentation [O32.1XX0]   delivery delivered [O82]    HPI    ROS:   Review of Systems      Procedures Performed  Procedure(s):   SECTION PRIMARY     , Low Transverse     Consults:   Consults     No orders found from 2022 to 2022.          Pertinent Test Results:   Lab Results (last 24 hours)     Procedure Component Value Units Date/Time    CBC & Differential [[id number]]  (Abnormal) Collected: 22    Specimen: Blood Updated: 22    Narrative:      The following orders were created for panel order CBC & Differential.  Procedure                               Abnormality         Status                     ---------                               -----------         ------                     CBC Auto Differential[[id number]]        Abnormal            Final result                 Please view results for these tests on the individual orders.    CBC Auto Differential [[id number]]  (Abnormal) Collected: 22    Specimen: Blood Updated: 22     WBC 6.47 10*3/mm3      RBC 2.95 10*6/mm3      Hemoglobin 9.1 g/dL      Hematocrit 28.1 %      MCV 95.3 fL      MCH 30.8 pg      MCHC 32.4 g/dL      RDW 13.2 %      RDW-SD 45.6 fl      MPV 10.5 fL      Platelets 214 10*3/mm3      Neutrophil % 68.7 %      Lymphocyte % 22.1 %      Monocyte % 7.9 %      Eosinophil % 0.8 %      Basophil % 0.3 %      Immature Grans % 0.2 %      Neutrophils, Absolute 4.45 10*3/mm3      Lymphocytes, Absolute 1.43 10*3/mm3      Monocytes, Absolute 0.51 10*3/mm3      Eosinophils, Absolute 0.05 10*3/mm3      Basophils, Absolute 0.02  10*3/mm3      Immature Grans, Absolute 0.01 10*3/mm3      nRBC 0.0 /100 WBC     Tissue Pathology Exam [[id number]] Collected: 22 1308    Specimen: Tissue from Placenta Updated: 22 5998          Condition on Discharge:  satis    Vital Signs  Temp:  [98.4 °F (36.9 °C)-98.8 °F (37.1 °C)] 98.4 °F (36.9 °C)  Heart Rate:  [59-86] 86  Resp:  [11-18] 16  BP: ()/(48-74) 117/71    Physical Exam:  Physical Exam      MEDICAL DECISION MAKING:     Discharge Disposition  Home or Self Care    Discharge Medications     Discharge Medications      New Medications      Instructions Start Date   docusate sodium 100 MG capsule   100 mg, Oral, 2 Times Daily PRN      ibuprofen 800 MG tablet  Commonly known as: ADVIL,MOTRIN   800 mg, Oral, Every 8 Hours PRN      oxyCODONE 5 MG immediate release tablet  Commonly known as: ROXICODONE   5 mg, Oral, Every 4 Hours PRN         Continue These Medications      Instructions Start Date   azithromycin 500 MG tablet  Commonly known as: Zithromax   Take 2 tablets on day #1 followed by 1 tablet daily on day #2-4.      ferrous gluconate 324 MG tablet  Commonly known as: FERGON   324 mg, Oral, 2 Times Daily      prenatal (CLASSIC) vitamin  tablet  Generic drug: prenatal vitamin   1 tablet, Oral, Daily             Discharge Diet   Diet Instructions     Diet: Regular      Discharge Diet: Regular          Activity at Discharge  Activity Instructions     Activity as Tolerated      Pelvic Rest            Follow-up Appointments  No future appointments.  Additional Instructions for the Follow-ups that You Need to Schedule     Call MD With Problems / Concerns   As directed      Instructions: 1. No driving for 2 wks  2. call for temp>101, heavy vaginal bleeding or increasing lower abdominal pain.  3. Continue prenatal vits for AT LEAST 6 weeks or as long as you breastfeed.    4. NOTHING IN THE VAGINA for 6 weeks.    5. For  sections, no heavy lifting for 6 weeks.   6. Bath or shower are  fine.    7. Call for any concerns with postpartum depression.  8. Be considering what you will use for contraception in the future.    Our office:  [phone]    Order Comments: Instructions: 1. No driving for 2 wks 2. call for temp>101, heavy vaginal bleeding or increasing lower abdominal pain. 3. Continue prenatal vits for AT LEAST 6 weeks or as long as you breastfeed.  4. NOTHING IN THE VAGINA for 6 weeks.  5. For  sections, no heavy lifting for 6 weeks. 6. Bath or shower are fine.  7. Call for any concerns with postpartum depression. 8. Be considering what you will use for contraception in the future.  Our office:  [phone]          Discharge Follow-up with Specified Provider: dr rogers; 2 Weeks   As directed      To: dr rogers    Follow Up: 2 Weeks    Follow Up Details: incision check                     Roberto Cordoba MD  11:26 EST  2022

## 2023-01-02 LAB
LAB AP CASE REPORT: NORMAL
PATH REPORT.FINAL DX SPEC: NORMAL

## 2023-01-03 ENCOUNTER — POSTPARTUM VISIT (OUTPATIENT)
Dept: OBSTETRICS AND GYNECOLOGY | Facility: CLINIC | Age: 28
End: 2023-01-03

## 2023-01-03 VITALS
WEIGHT: 159.2 LBS | BODY MASS INDEX: 29.3 KG/M2 | HEIGHT: 62 IN | DIASTOLIC BLOOD PRESSURE: 86 MMHG | SYSTOLIC BLOOD PRESSURE: 120 MMHG

## 2023-01-03 DIAGNOSIS — Z78.9 USES SPANISH AS PRIMARY SPOKEN LANGUAGE: ICD-10-CM

## 2023-01-03 LAB
BILIRUB BLD-MCNC: NEGATIVE MG/DL
CLARITY, POC: CLEAR
COLOR UR: YELLOW
GLUCOSE UR STRIP-MCNC: NEGATIVE MG/DL
KETONES UR QL: NEGATIVE
LEUKOCYTE EST, POC: NEGATIVE
NITRITE UR-MCNC: NEGATIVE MG/ML
PH UR: 5 [PH] (ref 5–8)
PROT UR STRIP-MCNC: NEGATIVE MG/DL
RBC # UR STRIP: NEGATIVE /UL
SP GR UR: 1 (ref 1–1.03)
UROBILINOGEN UR QL: NORMAL

## 2023-01-03 PROCEDURE — 0503F POSTPARTUM CARE VISIT: CPT | Performed by: STUDENT IN AN ORGANIZED HEALTH CARE EDUCATION/TRAINING PROGRAM

## 2023-01-03 NOTE — PROGRESS NOTES
PP Visit    28yo  s/p PLTCS for fetal malpresentation in the setting of labor and abruption. Doing well at this time. BF infant. Wants to discuss contraception. When she can carry more than 20 lbs. Denies F/C or N&V. No VB. Pain well controlled.           Review of Systems  See HPI      Vitals:    23 1110   BP: 120/86   Weight: 72.2 kg (159 lb 3.2 oz)   Height: 157.5 cm (62.01\")        Vitals: VSS; AF    General Appearance:  Awake. Alert. Well developed. Well nourished. In no acute distress.    Visual Inspection: ° Abdomen was normal on visual inspection.  Palpation: ° Abdomen was soft. ° Abdominal non-tender.    Uterus: ° Not tender.  Uterine Adnexae: ° Normal without masses or tenderness.  Neurological:  ° Oriented to time, place, and person.  Skin:  ° General appearance was normal. No bruising or ecchymosis.  Healing [place]        Assessment/Plan:     Doing well PP. Healing and performing ADL's  Up to Date handout in Finnish on contraception ( Pt may desires to go to [facility] for this; she is safe to use any method at this time @6weeks even if BF infant )   Discussed Pathology report       Finnish speaking-  used        I spent >30 minutes caring for Vi  on this date of service. This time includes time spent by me in the following activities: preparing for the visit, reviewing tests, obtaining and/or reviewing a separately obtained history, performing a medically appropriate examination and/or evaluation, counseling and educating the patient/family/caregiver, ordering medications, tests, or procedures, documenting information in the medical record, independently interpreting results and communicating that information with the patient/family/caregiver and care coordination     Dr. Jasper Don  [id number]

## 2023-02-01 ENCOUNTER — POSTPARTUM VISIT (OUTPATIENT)
Dept: OBSTETRICS AND GYNECOLOGY | Facility: CLINIC | Age: 28
End: 2023-02-01
Payer: COMMERCIAL

## 2023-02-01 VITALS
SYSTOLIC BLOOD PRESSURE: 122 MMHG | WEIGHT: 164 LBS | BODY MASS INDEX: 30.18 KG/M2 | HEIGHT: 62 IN | DIASTOLIC BLOOD PRESSURE: 60 MMHG

## 2023-02-01 DIAGNOSIS — Z78.9 USES SPANISH AS PRIMARY SPOKEN LANGUAGE: ICD-10-CM

## 2023-02-01 DIAGNOSIS — Z13.89 SCREENING FOR GENITOURINARY CONDITION: Primary | ICD-10-CM

## 2023-02-01 LAB
B-HCG UR QL: NEGATIVE
EXPIRATION DATE: NORMAL
INTERNAL NEGATIVE CONTROL: NORMAL
INTERNAL POSITIVE CONTROL: NORMAL
Lab: NORMAL

## 2023-02-01 PROCEDURE — 0503F POSTPARTUM CARE VISIT: CPT | Performed by: STUDENT IN AN ORGANIZED HEALTH CARE EDUCATION/TRAINING PROGRAM

## 2023-02-01 PROCEDURE — 81025 URINE PREGNANCY TEST: CPT | Performed by: STUDENT IN AN ORGANIZED HEALTH CARE EDUCATION/TRAINING PROGRAM

## 2025-02-20 ENCOUNTER — OFFICE VISIT (OUTPATIENT)
Dept: FAMILY MEDICINE CLINIC | Facility: CLINIC | Age: 30
End: 2025-02-20
Payer: COMMERCIAL

## 2025-02-20 VITALS
DIASTOLIC BLOOD PRESSURE: 56 MMHG | SYSTOLIC BLOOD PRESSURE: 90 MMHG | RESPIRATION RATE: 16 BRPM | WEIGHT: 161 LBS | HEART RATE: 72 BPM | TEMPERATURE: 98 F | OXYGEN SATURATION: 99 % | BODY MASS INDEX: 29.45 KG/M2

## 2025-02-20 DIAGNOSIS — M94.0 COSTOCHONDRITIS, ACUTE: ICD-10-CM

## 2025-02-20 DIAGNOSIS — R07.89 ATYPICAL CHEST PAIN: ICD-10-CM

## 2025-02-20 DIAGNOSIS — R10.13 EPIGASTRIC PAIN: Primary | ICD-10-CM

## 2025-02-20 PROCEDURE — 99204 OFFICE O/P NEW MOD 45 MIN: CPT | Performed by: FAMILY MEDICINE

## 2025-02-20 RX ORDER — FAMOTIDINE 20 MG/1
20 TABLET, FILM COATED ORAL 2 TIMES DAILY
Qty: 60 TABLET | Refills: 0 | Status: SHIPPED | OUTPATIENT
Start: 2025-02-20

## 2025-02-20 NOTE — PROGRESS NOTES
Chief Complaint  Abdominal Pain (Pain started 2 days ago in the right lower abdomen, then pain irradiates to the epigastric area and right side of back. /Patient stated that she had nausea and vomited once after pain started. /Pain continue in epigastric area. // )    Subjective         Vi Pearl presents to [facility]  History of Present Illness    30-year-old female G5, P5, 4-week postpartum establishing care today.patient complains of intermittent abdominal pain. Symptoms started 2 days ago with severe right lower quadrant tenderness x2, sharp, radiating to the back, this was associated with 1 episode of nonbloody vomiting.  Patient then started experiencing right upper quadrant and epigastric abdominal pain, 4/10 in severity, sharp.  Patient states the initial episode of pain happened after she was eating a pizza, this morning she had right upper quadrant and epigastric pain at 4 AM not preceded by food.  Patient also states she does experience frequent heartburns, she denies eating spicy food but she does drink coffee on empty stomach.    Patient states she has not been sexually active since she delivered her baby.      Patient also complaining of intermittent mild left upper chest wall pain, intermittent started over a year ago after she delivered her fourth baby, now patient has recovered after delivering her fifth baby.patient denies associated shortness of breath.      Objective     Review of Systems     History reviewed. No pertinent past medical history.     Current Outpatient Medications:     prenatal vitamin (prenatal, CLASSIC, vitamin) tablet, Take 1 tablet by mouth Daily., Disp: , Rfl:     azithromycin (Zithromax) 500 MG tablet, Take 2 tablets on day #1 followed by 1 tablet daily on day #2-4. (Patient not taking: Reported on 2/20/2025), Disp: 5 tablet, Rfl: 0    docusate sodium 100 MG capsule, Take 1 capsule by mouth 2 (Two) Times a Day As Needed for  Constipation. (Patient not taking: Reported on 2/20/2025), Disp: 30 each, Rfl: 0    famotidine (Pepcid) 20 MG tablet, Take 1 tablet by mouth 2 (Two) Times a Day., Disp: 60 tablet, Rfl: 0    ferrous gluconate (FERGON) 324 MG tablet, Take 1 tablet by mouth 2 (Two) Times a Day. (Patient not taking: Reported on 2/20/2025), Disp: 60 tablet, Rfl: 2    ibuprofen (ADVIL,MOTRIN) 800 MG tablet, Take 1 tablet by mouth Every 8 (Eight) Hours As Needed for Mild Pain. (Patient not taking: Reported on 2/20/2025), Disp: 30 tablet, Rfl: 0   Social History     Socioeconomic History    Marital status:    Tobacco Use    Smoking status: Never    Smokeless tobacco: Never   Vaping Use    Vaping status: Never Used   Substance and Sexual Activity    Alcohol use: Not Currently    Drug use: Never    Sexual activity: Yes     Partners: Male     Birth control/protection: None      Vital Signs:   BP 90/56   Pulse 72   Temp 98 °F (36.7 °C)   Resp 16   Wt 73 kg (161 lb)   SpO2 99%   BMI 29.45 kg/m²       Physical Exam  Cardiovascular:      Rate and Rhythm: Normal rate.   Chest:      Chest wall: Tenderness present.       Abdominal:      General: Bowel sounds are decreased.      Palpations: Abdomen is soft.      Tenderness: There is abdominal tenderness in the epigastric area. There is no right CVA tenderness or left CVA tenderness. Negative signs include Haile's sign and McBurney's sign.          Result Review :                 Assessment and Plan    Diagnoses and all orders for this visit:    1. Epigastric pain (Primary)  -     Hepatic Function Panel  -     Lipase  -     Basic metabolic panel  -     CBC & Differential  -     Gamma GT  -     CT Abdomen Pelvis With Contrast; Future  -     famotidine (Pepcid) 20 MG tablet; Take 1 tablet by mouth 2 (Two) Times a Day.  Dispense: 60 tablet; Refill: 0    2. Costochondritis, acute    3. Atypical chest pain      Epigastric pain  Undetermined etiology but based on patient's history and physical  I suspect peptic ulcer disease VS cholelithiasis.  Will start patient on Pepcid 20 mg twice daily  Advised patient to avoid spicy food and coffee on empty stomach  Keep food diary  Check abdominal CT   Advised patient to go to the hospital if she experience any worsening symptoms    Atypical chest pain/costochondritis  Patient symptoms and physical most consistent with costochondritis  Symptoms are currently very mild, will hold on starting NSAID treatment due to suspected peptic ulcer disease.      Follow Up   Return in about 2 weeks (around 3/6/2025) for Recheck.  Patient was given instructions and counseling regarding her condition or for health maintenance advice. Please see specific information pulled into the AVS if appropriate.

## 2025-02-21 LAB
ALBUMIN SERPL-MCNC: 4.1 G/DL (ref 4–5)
ALP SERPL-CCNC: 128 IU/L (ref 44–121)
ALT SERPL-CCNC: 41 IU/L (ref 0–32)
AST SERPL-CCNC: 30 IU/L (ref 0–40)
BASOPHILS # BLD AUTO: 0 X10E3/UL (ref 0–0.2)
BASOPHILS NFR BLD AUTO: 1 %
BILIRUB DIRECT SERPL-MCNC: <0.08 MG/DL (ref 0–0.4)
BILIRUB SERPL-MCNC: <0.2 MG/DL (ref 0–1.2)
BUN SERPL-MCNC: 19 MG/DL (ref 6–20)
BUN/CREAT SERPL: 19 (ref 9–23)
CALCIUM SERPL-MCNC: 8.9 MG/DL (ref 8.7–10.2)
CHLORIDE SERPL-SCNC: 103 MMOL/L (ref 96–106)
CO2 SERPL-SCNC: 23 MMOL/L (ref 20–29)
CREAT SERPL-MCNC: 1 MG/DL (ref 0.57–1)
EGFRCR SERPLBLD CKD-EPI 2021: 78 ML/MIN/1.73
EOSINOPHIL # BLD AUTO: 0.2 X10E3/UL (ref 0–0.4)
EOSINOPHIL NFR BLD AUTO: 4 %
ERYTHROCYTE [DISTWIDTH] IN BLOOD BY AUTOMATED COUNT: 12.8 % (ref 11.7–15.4)
GGT SERPL-CCNC: 26 IU/L (ref 0–60)
GLUCOSE SERPL-MCNC: 79 MG/DL (ref 70–99)
HCT VFR BLD AUTO: 36.1 % (ref 34–46.6)
HGB BLD-MCNC: 12.1 G/DL (ref 11.1–15.9)
IMM GRANULOCYTES # BLD AUTO: 0 X10E3/UL (ref 0–0.1)
IMM GRANULOCYTES NFR BLD AUTO: 1 %
LIPASE SERPL-CCNC: 27 U/L (ref 14–72)
LYMPHOCYTES # BLD AUTO: 1.6 X10E3/UL (ref 0.7–3.1)
LYMPHOCYTES NFR BLD AUTO: 32 %
MCH RBC QN AUTO: 30.3 PG (ref 26.6–33)
MCHC RBC AUTO-ENTMCNC: 33.5 G/DL (ref 31.5–35.7)
MCV RBC AUTO: 90 FL (ref 79–97)
MONOCYTES # BLD AUTO: 0.5 X10E3/UL (ref 0.1–0.9)
MONOCYTES NFR BLD AUTO: 10 %
NEUTROPHILS # BLD AUTO: 2.6 X10E3/UL (ref 1.4–7)
NEUTROPHILS NFR BLD AUTO: 52 %
PLATELET # BLD AUTO: 321 X10E3/UL (ref 150–450)
POTASSIUM SERPL-SCNC: 4.1 MMOL/L (ref 3.5–5.2)
PROT SERPL-MCNC: 6.8 G/DL (ref 6–8.5)
RBC # BLD AUTO: 4 X10E6/UL (ref 3.77–5.28)
SODIUM SERPL-SCNC: 139 MMOL/L (ref 134–144)
WBC # BLD AUTO: 5 X10E3/UL (ref 3.4–10.8)

## 2025-03-05 ENCOUNTER — HOSPITAL ENCOUNTER (EMERGENCY)
Facility: HOSPITAL | Age: 30
Discharge: HOME | End: 2025-03-06
Attending: STUDENT IN AN ORGANIZED HEALTH CARE EDUCATION/TRAINING PROGRAM
Payer: COMMERCIAL

## 2025-03-05 ENCOUNTER — APPOINTMENT (OUTPATIENT)
Dept: CT IMAGING | Facility: HOSPITAL | Age: 30
End: 2025-03-05
Payer: COMMERCIAL

## 2025-03-05 VITALS
TEMPERATURE: 97.7 F | HEIGHT: 62 IN | HEART RATE: 74 BPM | WEIGHT: 160 LBS | DIASTOLIC BLOOD PRESSURE: 69 MMHG | OXYGEN SATURATION: 100 % | BODY MASS INDEX: 29.44 KG/M2 | SYSTOLIC BLOOD PRESSURE: 119 MMHG | RESPIRATION RATE: 18 BRPM

## 2025-03-05 DIAGNOSIS — R10.9 ABDOMINAL PAIN, UNSPECIFIED ABDOMINAL LOCATION: ICD-10-CM

## 2025-03-05 DIAGNOSIS — K29.00 OTHER ACUTE GASTRITIS WITHOUT HEMORRHAGE: Primary | ICD-10-CM

## 2025-03-05 LAB
ALBUMIN SERPL-MCNC: 4.3 G/DL (ref 3.5–5.2)
ALBUMIN/GLOB SERPL: 1.4 G/DL
ALP SERPL-CCNC: 132 U/L (ref 39–117)
ALT SERPL W P-5'-P-CCNC: 60 U/L (ref 1–33)
ANION GAP SERPL CALCULATED.3IONS-SCNC: 13.6 MMOL/L (ref 5–15)
AST SERPL-CCNC: 48 U/L (ref 1–32)
B-HCG UR QL: NEGATIVE
BACTERIA UR QL AUTO: ABNORMAL /HPF
BASOPHILS # BLD AUTO: 0.04 10*3/MM3 (ref 0–0.2)
BASOPHILS NFR BLD AUTO: 0.7 % (ref 0–1.5)
BILIRUB SERPL-MCNC: 0.2 MG/DL (ref 0–1.2)
BILIRUB UR QL STRIP: NEGATIVE
BUN SERPL-MCNC: 16 MG/DL (ref 6–20)
BUN/CREAT SERPL: 21.9 (ref 7–25)
CALCIUM SPEC-SCNC: 9 MG/DL (ref 8.6–10.5)
CHLORIDE SERPL-SCNC: 100 MMOL/L (ref 98–107)
CLARITY UR: CLEAR
CO2 SERPL-SCNC: 22.4 MMOL/L (ref 22–29)
COLOR UR: YELLOW
CREAT SERPL-MCNC: 0.73 MG/DL (ref 0.57–1)
DEPRECATED RDW RBC AUTO: 42.5 FL (ref 37–54)
EGFRCR SERPLBLD CKD-EPI 2021: 113.6 ML/MIN/1.73
EOSINOPHIL # BLD AUTO: 0.16 10*3/MM3 (ref 0–0.4)
EOSINOPHIL NFR BLD AUTO: 2.6 % (ref 0.3–6.2)
ERYTHROCYTE [DISTWIDTH] IN BLOOD BY AUTOMATED COUNT: 12.7 % (ref 12.3–15.4)
FINE GRAN CASTS URNS QL MICRO: ABNORMAL /LPF
GLOBULIN UR ELPH-MCNC: 3.1 GM/DL
GLUCOSE SERPL-MCNC: 94 MG/DL (ref 65–99)
GLUCOSE UR STRIP-MCNC: NEGATIVE MG/DL
HCT VFR BLD AUTO: 37.1 % (ref 34–46.6)
HGB BLD-MCNC: 12.4 G/DL (ref 12–15.9)
HGB UR QL STRIP.AUTO: NEGATIVE
HYALINE CASTS UR QL AUTO: ABNORMAL /LPF
IMM GRANULOCYTES # BLD AUTO: 0.01 10*3/MM3 (ref 0–0.05)
IMM GRANULOCYTES NFR BLD AUTO: 0.2 % (ref 0–0.5)
KETONES UR QL STRIP: NEGATIVE
LEUKOCYTE ESTERASE UR QL STRIP.AUTO: ABNORMAL
LIPASE SERPL-CCNC: 30 U/L (ref 13–60)
LYMPHOCYTES # BLD AUTO: 1.69 10*3/MM3 (ref 0.7–3.1)
LYMPHOCYTES NFR BLD AUTO: 27.7 % (ref 19.6–45.3)
MCH RBC QN AUTO: 30.6 PG (ref 26.6–33)
MCHC RBC AUTO-ENTMCNC: 33.4 G/DL (ref 31.5–35.7)
MCV RBC AUTO: 91.6 FL (ref 79–97)
MONOCYTES # BLD AUTO: 0.46 10*3/MM3 (ref 0.1–0.9)
MONOCYTES NFR BLD AUTO: 7.5 % (ref 5–12)
NEUTROPHILS NFR BLD AUTO: 3.74 10*3/MM3 (ref 1.7–7)
NEUTROPHILS NFR BLD AUTO: 61.3 % (ref 42.7–76)
NITRITE UR QL STRIP: NEGATIVE
NRBC BLD AUTO-RTO: 0 /100 WBC (ref 0–0.2)
PH UR STRIP.AUTO: 6 [PH] (ref 4.5–8)
PLATELET # BLD AUTO: 276 10*3/MM3 (ref 140–450)
PMV BLD AUTO: 9.3 FL (ref 6–12)
POTASSIUM SERPL-SCNC: 3.7 MMOL/L (ref 3.5–5.2)
PROT SERPL-MCNC: 7.4 G/DL (ref 6–8.5)
PROT UR QL STRIP: NEGATIVE
RBC # BLD AUTO: 4.05 10*6/MM3 (ref 3.77–5.28)
RBC # UR STRIP: ABNORMAL /HPF
REF LAB TEST METHOD: ABNORMAL
SODIUM SERPL-SCNC: 136 MMOL/L (ref 136–145)
SP GR UR STRIP: 1.02 (ref 1–1.03)
SQUAMOUS #/AREA URNS HPF: ABNORMAL /HPF
UROBILINOGEN UR QL STRIP: ABNORMAL
WBC # UR STRIP: ABNORMAL /HPF
WBC NRBC COR # BLD AUTO: 6.1 10*3/MM3 (ref 3.4–10.8)

## 2025-03-05 PROCEDURE — 83690 ASSAY OF LIPASE: CPT | Performed by: STUDENT IN AN ORGANIZED HEALTH CARE EDUCATION/TRAINING PROGRAM

## 2025-03-05 PROCEDURE — 25010000002 KETOROLAC TROMETHAMINE PER 15 MG: Performed by: STUDENT IN AN ORGANIZED HEALTH CARE EDUCATION/TRAINING PROGRAM

## 2025-03-05 PROCEDURE — 96374 THER/PROPH/DIAG INJ IV PUSH: CPT

## 2025-03-05 PROCEDURE — 81001 URINALYSIS AUTO W/SCOPE: CPT | Performed by: STUDENT IN AN ORGANIZED HEALTH CARE EDUCATION/TRAINING PROGRAM

## 2025-03-05 PROCEDURE — 85025 COMPLETE CBC W/AUTO DIFF WBC: CPT | Performed by: STUDENT IN AN ORGANIZED HEALTH CARE EDUCATION/TRAINING PROGRAM

## 2025-03-05 PROCEDURE — 25010000002 ONDANSETRON PER 1 MG: Performed by: STUDENT IN AN ORGANIZED HEALTH CARE EDUCATION/TRAINING PROGRAM

## 2025-03-05 PROCEDURE — 96375 TX/PRO/DX INJ NEW DRUG ADDON: CPT

## 2025-03-05 PROCEDURE — 80053 COMPREHEN METABOLIC PANEL: CPT | Performed by: STUDENT IN AN ORGANIZED HEALTH CARE EDUCATION/TRAINING PROGRAM

## 2025-03-05 PROCEDURE — 81025 URINE PREGNANCY TEST: CPT | Performed by: STUDENT IN AN ORGANIZED HEALTH CARE EDUCATION/TRAINING PROGRAM

## 2025-03-05 PROCEDURE — 99285 EMERGENCY DEPT VISIT HI MDM: CPT | Performed by: STUDENT IN AN ORGANIZED HEALTH CARE EDUCATION/TRAINING PROGRAM

## 2025-03-05 PROCEDURE — 74177 CT ABD & PELVIS W/CONTRAST: CPT

## 2025-03-05 RX ORDER — ONDANSETRON 2 MG/ML
4 INJECTION INTRAMUSCULAR; INTRAVENOUS ONCE
Status: COMPLETED | OUTPATIENT
Start: 2025-03-05 | End: 2025-03-05

## 2025-03-05 RX ORDER — KETOROLAC TROMETHAMINE 30 MG/ML
15 INJECTION, SOLUTION INTRAMUSCULAR; INTRAVENOUS ONCE
Status: COMPLETED | OUTPATIENT
Start: 2025-03-05 | End: 2025-03-05

## 2025-03-05 RX ADMIN — KETOROLAC TROMETHAMINE 15 MG: 30 INJECTION, SOLUTION INTRAMUSCULAR; INTRAVENOUS at 23:24

## 2025-03-05 RX ADMIN — ONDANSETRON 4 MG: 2 INJECTION INTRAMUSCULAR; INTRAVENOUS at 23:23

## 2025-03-06 ENCOUNTER — OFFICE VISIT (OUTPATIENT)
Dept: FAMILY MEDICINE CLINIC | Facility: CLINIC | Age: 30
End: 2025-03-06
Payer: COMMERCIAL

## 2025-03-06 VITALS
OXYGEN SATURATION: 98 % | RESPIRATION RATE: 16 BRPM | DIASTOLIC BLOOD PRESSURE: 50 MMHG | BODY MASS INDEX: 29.22 KG/M2 | HEIGHT: 62 IN | WEIGHT: 158.8 LBS | SYSTOLIC BLOOD PRESSURE: 84 MMHG | HEART RATE: 68 BPM | TEMPERATURE: 98 F

## 2025-03-06 DIAGNOSIS — R51.9 NONINTRACTABLE EPISODIC HEADACHE, UNSPECIFIED HEADACHE TYPE: ICD-10-CM

## 2025-03-06 DIAGNOSIS — K29.70 GASTRITIS, PRESENCE OF BLEEDING UNSPECIFIED, UNSPECIFIED CHRONICITY, UNSPECIFIED GASTRITIS TYPE: Primary | ICD-10-CM

## 2025-03-06 PROCEDURE — 25510000001 IOPAMIDOL PER 1 ML: Performed by: STUDENT IN AN ORGANIZED HEALTH CARE EDUCATION/TRAINING PROGRAM

## 2025-03-06 RX ORDER — OMEPRAZOLE 40 MG/1
40 CAPSULE, DELAYED RELEASE ORAL DAILY
Qty: 90 CAPSULE | Refills: 0 | Status: SHIPPED | OUTPATIENT
Start: 2025-03-06

## 2025-03-06 RX ORDER — IOPAMIDOL 755 MG/ML
100 INJECTION, SOLUTION INTRAVASCULAR
Status: COMPLETED | OUTPATIENT
Start: 2025-03-06 | End: 2025-03-06

## 2025-03-06 RX ORDER — ONDANSETRON 4 MG/1
4 TABLET, ORALLY DISINTEGRATING ORAL EVERY 8 HOURS PRN
Qty: 10 TABLET | Refills: 0 | Status: SHIPPED | OUTPATIENT
Start: 2025-03-06

## 2025-03-06 RX ADMIN — IOPAMIDOL 100 ML: 755 INJECTION, SOLUTION INTRAVENOUS at 00:11

## 2025-03-06 NOTE — PROGRESS NOTES
Chief Complaint  Abdominal Pain and Headache    Subjective         Vi Francie Cain presents to [facility]  History of Present Illness      Patient speaks Tajik, interpretation provided by [employer]  elizabet ID [id number].    30-year-old female presents today for abdominal pain follow-up.  Patient has periumbilical and epigastric sharp abdominal pain, intermittent, radiates to her back, 7/10 in severity.  Symptoms proceeded with food.  Labs on 2/20/2025 showed mildly elevated alk phosphatase and ALT otherwise normal CMP and normal lipase.  Patient was prescribed Pepcid 20 mg twice daily, patient took the medication but states it caused her more abdominal pain.     Patient went to the ER yesterday for the same complaint, CT scan of abdomen pelvis showed gallstones without cholecystitis and mild diffuse stomach wall thickening    Patient complains of intermittent right-sided headache, throbbing, 7 in intensity, associated with nausea without vomiting.  Patient states her headache is usually associated with blurred vision, she denies associated symptoms or weakness.  Patient states she usually takes Aleve that helps with the pain, she has not taken Aleve today.    Objective     Review of Systems     History reviewed. No pertinent past medical history.     Current Outpatient Medications:     ibuprofen (ADVIL,MOTRIN) 800 MG tablet, Take 1 tablet by mouth Every 8 (Eight) Hours As Needed for Mild Pain., Disp: 30 tablet, Rfl: 0    prenatal vitamin (prenatal, CLASSIC, vitamin) tablet, Take 1 tablet by mouth Daily., Disp: , Rfl:     azithromycin (Zithromax) 500 MG tablet, Take 2 tablets on day #1 followed by 1 tablet daily on day #2-4. (Patient not taking: Reported on 2/20/2025), Disp: 5 tablet, Rfl: 0    docusate sodium 100 MG capsule, Take 1 capsule by mouth 2 (Two) Times a Day As Needed for Constipation. (Patient not taking: Reported on 3/6/2025), Disp: 30 each, Rfl: 0    ferrous  "gluconate (FERGON) 324 MG tablet, Take 1 tablet by mouth 2 (Two) Times a Day. (Patient not taking: Reported on 3/6/2025), Disp: 60 tablet, Rfl: 2    omeprazole (priLOSEC) 40 MG capsule, Take 1 capsule by mouth Daily., Disp: 90 capsule, Rfl: 0    ondansetron ODT (ZOFRAN-ODT) 4 MG disintegrating tablet, Take 1 tablet by mouth Every 8 (Eight) Hours As Needed for Nausea or Vomiting. (Patient not taking: Reported on 3/6/2025), Disp: 10 tablet, Rfl: 0  No current facility-administered medications for this visit.   Social History     Socioeconomic History    Marital status:    Tobacco Use    Smoking status: Never    Smokeless tobacco: Never   Vaping Use    Vaping status: Never Used   Substance and Sexual Activity    Alcohol use: Not Currently    Drug use: Never    Sexual activity: Yes     Partners: Male     Birth control/protection: None      Vital Signs:   BP (!) 84/50   Pulse 68   Temp 98 °F (36.7 °C)   Resp 16   Ht 157.5 cm (62\")   Wt 72 kg (158 lb 12.8 oz)   SpO2 98%   BMI 29.04 kg/m²       Physical Exam  Eyes:      Extraocular Movements: Extraocular movements intact.      Pupils: Pupils are equal, round, and reactive to light.   Abdominal:      General: Abdomen is flat.      Tenderness:  in the right upper quadrant, epigastric area and periumbilical area   Neurological:      Motor: No weakness.          Result Review :                    Latest Reference Range & Units 02/20/25 14:49 03/05/25 23:23   Sodium 136 - 145 mmol/L 139 136   Potassium 3.5 - 5.2 mmol/L 4.1 3.7   Chloride 98 - 107 mmol/L 103 100   CO2 22.0 - 29.0 mmol/L 23 22.4   Anion Gap 5.0 - 15.0 mmol/L  13.6   BUN 6 - 20 mg/dL 19 16   Creatinine 0.57 - 1.00 mg/dL 1.00 0.73   BUN/Creatinine Ratio 7.0 - 25.0  19 21.9   eGFR >60.0 mL/min/1.73  113.6   EGFR Result >59 mL/min/1.73 78    Glucose 65 - 99 mg/dL 79 94   Calcium 8.6 - 10.5 mg/dL 8.9 9.0   Alkaline Phosphatase 39 - 117 U/L 128 (H) 132 (H)   Total Protein 6.0 - 8.5 g/dL 6.8 7.4   Albumin " 3.5 - 5.2 g/dL 4.1 4.3   Globulin gm/dL  3.1   A/G Ratio g/dL  1.4   AST (SGOT) 1 - 32 U/L 30 48 (H)   ALT (SGPT) 1 - 33 U/L 41 (H) 60 (H)   Total Bilirubin 0.0 - 1.2 mg/dL <0.2 0.2   Bilirubin, Direct 0.00 - 0.40 mg/dL <0.08    GGT 0 - 60 IU/L 26    Lipase 13 - 60 U/L 27 30   WBC 3.40 - 10.80 10*3/mm3 5.0 6.10   RBC 3.77 - 5.28 10*6/mm3 4.00 4.05   Hemoglobin 12.0 - 15.9 g/dL 12.1 12.4   Hematocrit 34.0 - 46.6 % 36.1 37.1   Platelets 140 - 450 10*3/mm3 321 276   RDW 12.3 - 15.4 % 12.8 12.7   MCV 79.0 - 97.0 fL 90 91.6   MCH 26.6 - 33.0 pg 30.3 30.6   MCHC 31.5 - 35.7 g/dL 33.5 33.4   MPV 6.0 - 12.0 fL  9.3   RDW-SD 37.0 - 54.0 fl  42.5   (H): Data is abnormally high    Abdomen pelvis CT 3/5/2025    IMPRESSION:  Impression:  1.Mild diffuse stomach wall thickening with hyperenhancement of the mucosa likely related to gastritis. Otherwise, no acute process.  2.Cholelithiasis without secondary findings of acute cholecystitis.  3.Ancillary findings as described above.      Assessment and Plan    Diagnoses and all orders for this visit:    1. Gastritis, presence of bleeding unspecified, unspecified chronicity, unspecified gastritis type (Primary)  -     omeprazole (priLOSEC) 40 MG capsule; Take 1 capsule by mouth Daily.  Dispense: 90 capsule; Refill: 0  -     H. Pylori Antigen, Stool - Stool, Per Rectum    2. Nonintractable episodic headache, unspecified headache type        Abdominal pain/gastritis  Patient has gastritis changes on CT scan with epigastric tenderness and positive Haile sign on exam.  Possible gastritis VS gallbladder colic  Patient was referred to general surgery from the ER yesterday   Discontinue Pepcid   I will start patient on Prilosec 40 mg  I will check stool H. pylori antigen    Headache  Based on the patient's history I suspect migraine headache  Advised patient to use Tylenol as needed and avoid Aleve, naproxen and ibuprofen  Follow-up in 2 weeks for further discussion.      Follow Up   Return  in about 3 months (around 6/6/2025) for Recheck.  Patient was given instructions and counseling regarding her condition or for health maintenance advice. Please see specific information pulled into the AVS if appropriate.

## 2025-03-06 NOTE — ED PROVIDER NOTES
Subjective   History of Present Illness  30-year-old healthy Danish-speaking female presents to the emergency department with complaints of right abdominal pain to the upper, lower and epigastric pain.  The patient admits to nausea with no vomiting or diarrhea.  The patient denies dysuria.  She states that she gave birth about a month and a half ago and there has been no complications since then.  She states that she does have some right flank pain that is worse in the anterior portion of her body.  It is worse with food.  Denies fever.      Review of Systems   Constitutional:  Negative for activity change, appetite change, diaphoresis and fatigue.   All other systems reviewed and are negative.      History reviewed. No pertinent past medical history.    No Known Allergies    Past Surgical History:   Procedure Laterality Date     SECTION N/A 2022    Procedure:  SECTION PRIMARY;  Surgeon: Jasper Don DO;  Location: [facility] LABOR DELIVERY;  Service: Obstetrics;  Laterality: N/A;    NO PAST SURGERIES         Family History   Problem Relation Age of Onset    Breast cancer Neg Hx     Ovarian cancer Neg Hx     Uterine cancer Neg Hx     Colon cancer Neg Hx     Deep vein thrombosis Neg Hx     Pulmonary embolism Neg Hx     Birth defects Neg Hx     Mental retardation Neg Hx        Social History     Socioeconomic History    Marital status:    Tobacco Use    Smoking status: Never    Smokeless tobacco: Never   Vaping Use    Vaping status: Never Used   Substance and Sexual Activity    Alcohol use: Not Currently    Drug use: Never    Sexual activity: Yes     Partners: Male     Birth control/protection: None           Objective   Physical Exam  Vitals and nursing note reviewed.   Constitutional:       General: She is not in acute distress.     Appearance: Normal appearance. She is not ill-appearing, toxic-appearing or diaphoretic.   HENT:      Head: Normocephalic and atraumatic.      Right Ear:  Tympanic membrane and external ear normal.      Left Ear: Tympanic membrane and external ear normal.      Nose: Nose normal. No congestion or rhinorrhea.      Mouth/Throat:      Mouth: Mucous membranes are moist.   Eyes:      Conjunctiva/sclera: Conjunctivae normal.      Pupils: Pupils are equal, round, and reactive to light.   Cardiovascular:      Rate and Rhythm: Normal rate and regular rhythm.      Pulses: Normal pulses.   Pulmonary:      Effort: Pulmonary effort is normal. No respiratory distress.      Breath sounds: Normal breath sounds. No stridor. No wheezing, rhonchi or rales.   Abdominal:      General: There is no distension.      Tenderness: There is abdominal tenderness in the right upper quadrant, right lower quadrant and epigastric area. There is no right CVA tenderness, left CVA tenderness, guarding or rebound. Negative signs include Haile's sign, Rovsing's sign, McBurney's sign and psoas sign.   Musculoskeletal:         General: No swelling or deformity. Normal range of motion.      Cervical back: Normal range of motion and neck supple. No rigidity.   Skin:     General: Skin is warm.      Coloration: Skin is not pale.   Neurological:      General: No focal deficit present.      Mental Status: She is alert and oriented to person, place, and time. Mental status is at baseline.   Psychiatric:         Mood and Affect: Mood normal.         Thought Content: Thought content normal.         Procedures           ED Course                                                       Medical Decision Making    MDM:    Escalation of care including admission/observation considered    - Discussions of management with other providers:  None    - Discussed/reviewed with Radiology regarding test interpretation    - Independent interpretation: Labs and CT    - Additional patient history obtained from: [facility]    - Review of external non-ED record (if available):  Prior Inpt record, Office record, Outpt record, Prior Outpt  labs, PCP record, Outside ED record, Other    - Chronic conditions affecting care: See HPI and medical Hx.    - Social Determinants of health significantly affecting care:  None        Medical Decision Making Discussion:    This is a 30-year-old who presents to the emergency department complaints of abdominal pain to the epigastrium and diffusely throughout.  She has no focal tenderness or evidence of pain at McBurney's point or Haile sign.  Nevertheless CT is obtained.  The patient has some diffuse stomach wall thickening with mucosal enhancement suggestive of gastritis but no acute abnormality otherwise.  She has cholelithiasis with no secondary findings of cholecystitis.  Mild elevation in liver enzymes.  She is not showing any evidence of leukocytosis or leukopenia.  She is afebrile.  I will prescribe no bentyl as she is breastfeeding but Zofran for home, diet restrictions and fluid recommended at home.  She has a contaminated urine with squamous epithelial cells but no evidence of infection with trace leukocytes but likely, again, this is contaminant.  Her lipase is negative.  Chemistry otherwise stable.  Bilirubin also stable.  I do recommend follow-up with surgery on an outpatient basis for gallstones and explained this to patient, I will give referral.  She will be discharged at this time.    The patient has been given very strict return precautions to return to the emergency department should there be any acute change or worsening of their condition.  I have explained my findings and the patient has expressed understanding to me.  I explained that the work-up performed in the ED has been based on the specific complaint and concern, as the nature of emergency medicine is complaint driven and they understand that new symptoms may arise.  I have told them that, should there be any new symptoms, worsening or changing symptoms, a new work-up may be indicated that they are encouraged to return to the emergency  department or promptly contact their primary care physician. We have employed a shared decision-making process as the discussion of their disposition.  The patient has been educated as to the nature of the visit, the tests and work-up performed and the findings from today's visit. At this time, there does not appear to be any acute emergent process that necessitates admission to the hospital, however, the patient understands that this can change unexpectedly. At this time, the patient is stable for discharge home and agrees to follow-up with her primary care physician in the next 24 to 48 hours or earlier should they be able to obtain an appointment.    The patient was counseled regarding diagnostic results and treatment plan and patient has indicated understanding of these instructions.      Problems Addressed:  Abdominal pain, unspecified abdominal location: complicated acute illness or injury  Other acute gastritis without hemorrhage: complicated acute illness or injury    Amount and/or Complexity of Data Reviewed  Labs: ordered.  Radiology: ordered.    Risk  Prescription drug management.        Final diagnoses:   Other acute gastritis without hemorrhage   Abdominal pain, unspecified abdominal location       ED Disposition  ED Disposition       ED Disposition   Discharge    Condition   Good    Comment   --               Werner Worthington MD  [address]  [address]  [facility] [address] [phone]          Kenji Bryan MD  [address]  [facility] [id number] [phone]      For gallbladder evaluation         Medication List        New Prescriptions      ondansetron ODT 4 MG disintegrating tablet  Commonly known as: ZOFRAN-ODT  Take 1 tablet by mouth Every 8 (Eight) Hours As Needed for Nausea or Vomiting.               Where to Get Your Medications        These medications were sent to [id number] - [place], [place] - [address] - [phone]  PH - [phone] FX  [address] [address]      Phone: [phone]   ondansetron ODT 4 MG disintegrating tablet            Kvng Mccabe,   03/06/25 0025       Kvng Mccabe,   03/06/25 0037

## 2025-03-11 ENCOUNTER — RESULTS FOLLOW-UP (OUTPATIENT)
Dept: FAMILY MEDICINE CLINIC | Facility: CLINIC | Age: 30
End: 2025-03-11
Payer: COMMERCIAL

## 2025-03-11 LAB — REQUEST PROBLEM: NORMAL

## 2025-03-13 LAB — H PYLORI AG STL QL IA: POSITIVE

## 2025-03-14 ENCOUNTER — TELEPHONE (OUTPATIENT)
Dept: SURGERY | Facility: CLINIC | Age: 30
End: 2025-03-14

## 2025-03-14 ENCOUNTER — RESULTS FOLLOW-UP (OUTPATIENT)
Dept: FAMILY MEDICINE CLINIC | Facility: CLINIC | Age: 30
End: 2025-03-14
Payer: COMMERCIAL

## 2025-03-14 DIAGNOSIS — A04.8 H. PYLORI INFECTION: Primary | ICD-10-CM

## 2025-03-14 NOTE — TELEPHONE ENCOUNTER
This patient requires an  for their appointment. Please see the  information below.     Caller: TYSON MAGALLON  Relationship to patient: SELF  Best call back number: [phone]    Service: IPAD  Is  needs updated in registration: yes   Date of Appointment:03/19  Time of Appointment:815  Additional notes:IPAD

## 2025-03-18 RX ORDER — VONOPRAZAN FUMARATE AND AMOXICILLIN 20MG-500MG
8 KIT ORAL DAILY
Qty: 112 EACH | Refills: 0 | Status: SHIPPED | OUTPATIENT
Start: 2025-03-18

## 2025-03-19 ENCOUNTER — OFFICE VISIT (OUTPATIENT)
Dept: SURGERY | Facility: CLINIC | Age: 30
End: 2025-03-19
Payer: COMMERCIAL

## 2025-03-19 VITALS
HEIGHT: 62 IN | WEIGHT: 157 LBS | SYSTOLIC BLOOD PRESSURE: 110 MMHG | DIASTOLIC BLOOD PRESSURE: 64 MMHG | BODY MASS INDEX: 28.89 KG/M2

## 2025-03-19 DIAGNOSIS — K81.9 CHOLECYSTITIS: Primary | ICD-10-CM

## 2025-03-19 RX ORDER — OMEPRAZOLE 40 MG/1
40 CAPSULE, DELAYED RELEASE ORAL DAILY
COMMUNITY

## 2025-03-19 RX ORDER — FAMOTIDINE 20 MG/1
20 TABLET, FILM COATED ORAL 2 TIMES DAILY
COMMUNITY

## 2025-03-19 NOTE — PROGRESS NOTES
Colorectal & General Surgery  Consultation    Patient: Vi Cain  YOB: 1995  MRN: [id number]      Assessment  Vi Cain is a 30 y.o. female with biliary colic and likely some element of chronic ductal cholecystitis.  Discussed role laparoscopic cholecystectomy with intraoperative angiogram.  We discussed the risk, benefits, alternatives to procedure, including the risk of bile duct injury and bile leak, bleeding, infection, damage surrounding structures, need for additional procedures.  Informed consent was obtained.  Will plan to proceed to the operating room for laparoscopic cholecystectomy with intraoperative cholangiogram.    She does have mild transaminitis.  We will plan to perform cholecystectomy and then check her liver function enzymes postoperatively.  She may require referral to gastroenterology for assistance with management of fatty liver disease, etc. may plan for postoperative ultrasound as well.    Referring Provider: Kvng Mccabe DO     Reason for Consultation: Right upper quad abdominal pain    History of Present Illness   Vi Cain is a 30 y.o. female who presented to the emergency department with the third episode of right upper quadrant abdominal pain that radiated to her back.  It is always associated with nausea and vomiting.  It is intermittent.  Its happened 3 times in the last 2 months.    Most recent colonoscopy: Never    Past Medical History   History reviewed. No pertinent past medical history.     Past Surgical History   Past Surgical History:   Procedure Laterality Date     SECTION N/A 2022    Procedure:  SECTION PRIMARY;  Surgeon: Jasper Don DO;  Location: [facility] LABOR DELIVERY;  Service: Obstetrics;  Laterality: N/A;       Social History  Social History     Socioeconomic History    Marital status:    Tobacco Use    Smoking status: Never    Smokeless tobacco: Never   Vaping Use    Vaping status: Never  Used   Substance and Sexual Activity    Alcohol use: Not Currently    Drug use: Never    Sexual activity: Yes     Partners: Male     Birth control/protection: None       Family History  Family History   Problem Relation Age of Onset    Breast cancer Neg Hx     Ovarian cancer Neg Hx     Uterine cancer Neg Hx     Colon cancer Neg Hx     Deep vein thrombosis Neg Hx     Pulmonary embolism Neg Hx     Birth defects Neg Hx     Mental retardation Neg Hx        Colorectal cancer family history: None    Review of Systems  Negative except as documented in the HPI.     Allergies  No Known Allergies    Medications    Current Outpatient Medications:     Amoxicillin & Vonoprazan (Voquezna Dual Sharif) 500 & 20 MG therapy, Take 8 each by mouth Daily. Follow packaging information, Take amoxicillin 1 g 3 times daily, Take fluconazole 20 mg twice daily, Disp: 112 each, Rfl: 0    famotidine (PEPCID) 20 MG tablet, Take 1 tablet by mouth 2 (Two) Times a Day., Disp: , Rfl:     omeprazole (priLOSEC) 40 MG capsule, Take 1 capsule by mouth Daily., Disp: , Rfl:     ondansetron ODT (ZOFRAN-ODT) 4 MG disintegrating tablet, Take 1 tablet by mouth Every 8 (Eight) Hours As Needed for Nausea or Vomiting., Disp: 10 tablet, Rfl: 0    prenatal vitamin (prenatal, CLASSIC, vitamin) tablet, Take 1 tablet by mouth Daily., Disp: , Rfl:     Vital Signs  Vitals:    03/19/25 0809   BP: 110/64        Physical Exam  Constitutional: Resting comfortably, no acute distress  Neck: Supple, trachea midline  Respiratory: No increased work of breathing, Symmetric excursion  Cardiovascular: Well pefursed, no jugular venous distention evident   Abdominal: Soft, non-tender, non-distended  Lymphatics: No cervical or suprascapular adenopathy  Skin: Warm, dry, no rash on visualized skin surfaces  Musculoskeletal: Symmetric strength, no obvious gross abnormalities  Psychiatric: Alert and oriented ×3, normal affect     Laboratory Results  I have personally reviewed CMP with  creatinine 0.73, ALT 60, AST 48, total bilirubin 0.2, alkaline phosphatase 132.  CBC with WC 6, hemoglobin 12, platelets 276.  Lipase 30.    Radiology  I have personally reviewed CT scan the abdomen pelvis demonstrates a distended gallbladder with cholelithiasis.  No other significant findings.    Endoscopy  None to review         Reza Guerra MD  Colorectal & General Surgery  [facility]    [address], [address]  [place], [place], [address]  P: [phone]  F: [phone]

## 2025-03-19 NOTE — H&P (VIEW-ONLY)
Colorectal & General Surgery  Consultation    Patient: Vi Cain  YOB: 1995  MRN: [id number]      Assessment  Vi Cain is a 30 y.o. female with biliary colic and likely some element of chronic ductal cholecystitis.  Discussed role laparoscopic cholecystectomy with intraoperative angiogram.  We discussed the risk, benefits, alternatives to procedure, including the risk of bile duct injury and bile leak, bleeding, infection, damage surrounding structures, need for additional procedures.  Informed consent was obtained.  Will plan to proceed to the operating room for laparoscopic cholecystectomy with intraoperative cholangiogram.    She does have mild transaminitis.  We will plan to perform cholecystectomy and then check her liver function enzymes postoperatively.  She may require referral to gastroenterology for assistance with management of fatty liver disease, etc. may plan for postoperative ultrasound as well.    Referring Provider: Kvng Mccabe DO     Reason for Consultation: Right upper quad abdominal pain    History of Present Illness   Vi Cain is a 30 y.o. female who presented to the emergency department with the third episode of right upper quadrant abdominal pain that radiated to her back.  It is always associated with nausea and vomiting.  It is intermittent.  Its happened 3 times in the last 2 months.    Most recent colonoscopy: Never    Past Medical History   History reviewed. No pertinent past medical history.     Past Surgical History   Past Surgical History:   Procedure Laterality Date     SECTION N/A 2022    Procedure:  SECTION PRIMARY;  Surgeon: Jasper Don DO;  Location: [facility] LABOR DELIVERY;  Service: Obstetrics;  Laterality: N/A;       Social History  Social History     Socioeconomic History    Marital status:    Tobacco Use    Smoking status: Never    Smokeless tobacco: Never   Vaping Use    Vaping status: Never  Used   Substance and Sexual Activity    Alcohol use: Not Currently    Drug use: Never    Sexual activity: Yes     Partners: Male     Birth control/protection: None       Family History  Family History   Problem Relation Age of Onset    Breast cancer Neg Hx     Ovarian cancer Neg Hx     Uterine cancer Neg Hx     Colon cancer Neg Hx     Deep vein thrombosis Neg Hx     Pulmonary embolism Neg Hx     Birth defects Neg Hx     Mental retardation Neg Hx        Colorectal cancer family history: None    Review of Systems  Negative except as documented in the HPI.     Allergies  No Known Allergies    Medications    Current Outpatient Medications:     Amoxicillin & Vonoprazan (Voquezna Dual Sharif) 500 & 20 MG therapy, Take 8 each by mouth Daily. Follow packaging information, Take amoxicillin 1 g 3 times daily, Take fluconazole 20 mg twice daily, Disp: 112 each, Rfl: 0    famotidine (PEPCID) 20 MG tablet, Take 1 tablet by mouth 2 (Two) Times a Day., Disp: , Rfl:     omeprazole (priLOSEC) 40 MG capsule, Take 1 capsule by mouth Daily., Disp: , Rfl:     ondansetron ODT (ZOFRAN-ODT) 4 MG disintegrating tablet, Take 1 tablet by mouth Every 8 (Eight) Hours As Needed for Nausea or Vomiting., Disp: 10 tablet, Rfl: 0    prenatal vitamin (prenatal, CLASSIC, vitamin) tablet, Take 1 tablet by mouth Daily., Disp: , Rfl:     Vital Signs  Vitals:    03/19/25 0809   BP: 110/64        Physical Exam  Constitutional: Resting comfortably, no acute distress  Neck: Supple, trachea midline  Respiratory: No increased work of breathing, Symmetric excursion  Cardiovascular: Well pefursed, no jugular venous distention evident   Abdominal: Soft, non-tender, non-distended  Lymphatics: No cervical or suprascapular adenopathy  Skin: Warm, dry, no rash on visualized skin surfaces  Musculoskeletal: Symmetric strength, no obvious gross abnormalities  Psychiatric: Alert and oriented ×3, normal affect     Laboratory Results  I have personally reviewed CMP with  creatinine 0.73, ALT 60, AST 48, total bilirubin 0.2, alkaline phosphatase 132.  CBC with WC 6, hemoglobin 12, platelets 276.  Lipase 30.    Radiology  I have personally reviewed CT scan the abdomen pelvis demonstrates a distended gallbladder with cholelithiasis.  No other significant findings.    Endoscopy  None to review         Reza Guerra MD  Colorectal & General Surgery  [facility]    [address], [address]  [place], [place], [address]  P: [phone]  F: [phone]

## 2025-03-20 ENCOUNTER — OFFICE VISIT (OUTPATIENT)
Dept: FAMILY MEDICINE CLINIC | Facility: CLINIC | Age: 30
End: 2025-03-20
Payer: COMMERCIAL

## 2025-03-20 VITALS
HEART RATE: 71 BPM | BODY MASS INDEX: 29.66 KG/M2 | RESPIRATION RATE: 16 BRPM | TEMPERATURE: 98 F | SYSTOLIC BLOOD PRESSURE: 94 MMHG | OXYGEN SATURATION: 98 % | WEIGHT: 161.2 LBS | HEIGHT: 62 IN | DIASTOLIC BLOOD PRESSURE: 60 MMHG

## 2025-03-20 DIAGNOSIS — A04.8 H. PYLORI INFECTION: ICD-10-CM

## 2025-03-20 DIAGNOSIS — G43.109 MIGRAINE WITH AURA AND WITHOUT STATUS MIGRAINOSUS, NOT INTRACTABLE: Primary | ICD-10-CM

## 2025-03-20 RX ORDER — SUMATRIPTAN 50 MG/1
TABLET, FILM COATED ORAL
Qty: 30 TABLET | Refills: 0 | Status: SHIPPED | OUTPATIENT
Start: 2025-03-20 | End: 2025-03-24 | Stop reason: SDUPTHER

## 2025-03-20 RX ORDER — SUMATRIPTAN SUCCINATE 25 MG/1
TABLET ORAL
Status: CANCELLED | OUTPATIENT
Start: 2025-03-20

## 2025-03-20 RX ORDER — PROPRANOLOL HCL 20 MG
20 TABLET ORAL DAILY
Qty: 90 TABLET | Refills: 0 | Status: SHIPPED | OUTPATIENT
Start: 2025-03-20

## 2025-03-20 NOTE — PROGRESS NOTES
Chief Complaint  Primary Care Follow-Up and Headache    Subjective         Vi Cain presents to [facility] PRIMARY [facility]ARE  History of Present Illness    2 years, throbin right sided,  once a week, 10 month 2-3 times a week.     30-year-old female presents today to discuss headache.  Patient complains of right-sided headache, throbbing, intermittent, 8/10 in severity, associated with blurred vision.  Symptoms started 2 years ago, initially patient was having symptoms once a week but for the last 10 months patient has been having symptoms more frequently 2-3 times a week.  Patient does report right extremity numbness just before the headache starts, once the numbness resolved patient started experiencing the headache.  Patient denies other abnormal sensations before the headache.  Patient states the headache is provoked by bright lights and improves with laying in dark quiet room.  Patient said headache sometimes last for 2 days.  Patient has tried Advil without significant improvement in her symptoms.      Patient has H. pylori infection, she was started on Voquenza dual treatment, has not started the medication yet.  Will need pregnancy test today    Objective     Review of Systems     History reviewed. No pertinent past medical history.     Current Outpatient Medications:     famotidine (PEPCID) 20 MG tablet, Take 1 tablet by mouth 2 (Two) Times a Day., Disp: , Rfl:     omeprazole (priLOSEC) 40 MG capsule, Take 1 capsule by mouth Daily., Disp: , Rfl:     prenatal vitamin (prenatal, CLASSIC, vitamin) tablet, Take 1 tablet by mouth Daily., Disp: , Rfl:     Amoxicillin & Vonoprazan (Voquezna Dual Sharif) 500 & 20 MG therapy, Take 8 each by mouth Daily. Follow packaging information, Take amoxicillin 1 g 3 times daily, Take fluconazole 20 mg twice daily (Patient not taking: Reported on 3/20/2025), Disp: 112 each, Rfl: 0    ondansetron ODT (ZOFRAN-ODT) 4 MG disintegrating tablet, Take 1 tablet by  "mouth Every 8 (Eight) Hours As Needed for Nausea or Vomiting. (Patient not taking: Reported on 3/20/2025), Disp: 10 tablet, Rfl: 0    propranolol (INDERAL) 20 MG tablet, Take 1 tablet by mouth Daily., Disp: 90 tablet, Rfl: 0    SUMAtriptan (Imitrex) 50 MG tablet, Take one tablet at onset of headache. May repeat dose one time in 2 hours if headache not relieved., Disp: 30 tablet, Rfl: 0   Social History     Socioeconomic History    Marital status:    Tobacco Use    Smoking status: Never    Smokeless tobacco: Never   Vaping Use    Vaping status: Never Used   Substance and Sexual Activity    Alcohol use: Not Currently    Drug use: Never    Sexual activity: Yes     Partners: Male     Birth control/protection: None      Vital Signs:   BP 94/60 (BP Location: Right arm, Patient Position: Sitting, Cuff Size: Adult)   Pulse 71   Temp 98 °F (36.7 °C)   Resp 16   Ht 157.5 cm (62\")   Wt 73.1 kg (161 lb 3.2 oz)   SpO2 98%   BMI 29.48 kg/m²       Physical Exam  Constitutional:       Appearance: Normal appearance.   HENT:      Head: Normocephalic and atraumatic.   Eyes:      Extraocular Movements: Extraocular movements intact.      Pupils: Pupils are equal, round, and reactive to light.   Neurological:      Mental Status: She is alert.      Motor: No weakness.          Result Review :                 Assessment and Plan    Diagnoses and all orders for this visit:    1. Migraine with aura and without status migrainosus, not intractable (Primary)  -     SUMAtriptan (Imitrex) 50 MG tablet; Take one tablet at onset of headache. May repeat dose one time in 2 hours if headache not relieved.  Dispense: 30 tablet; Refill: 0  -     propranolol (INDERAL) 20 MG tablet; Take 1 tablet by mouth Daily.  Dispense: 90 tablet; Refill: 0    2. H. pylori infection  -     POC Pregnancy, Urine        Migraine with aura  Patient symptoms is consistent with frequent migraine with aura 2-3 times a week  I will start patient on propranolol, " will start at a lower dose 20 mg daily due to borderline low blood pressure, advised patient to monitor blood pressure and call if having any symptoms.  I will start patient on Imitrex for acute attack as needed  Follow-up in 6 weeks    H. pylori infection  Will check urine pregnancy test before starting Voquenza/amoxicillin dual packet  Advised patient to wear condoms during sexual intercourse to prevent pregnancy while on Voquenza treatment  Hold omeprazole      Follow Up   No follow-ups on file.  Patient was given instructions and counseling regarding her condition or for health maintenance advice. Please see specific information pulled into the AVS if appropriate.

## 2025-03-20 NOTE — PROGRESS NOTES
"Chief Complaint  Primary Care Follow-Up and Headache    Subjective    {Problem List  Visit Diagnosis   Encounters  Notes  Medications  Labs  Result Review Imaging  Media :23}     Vi Cain presents to [facility] PRIMARY [facility]ARE  History of Present Illness      Objective     Review of Systems     History reviewed. No pertinent past medical history.     Current Outpatient Medications:   •  famotidine (PEPCID) 20 MG tablet, Take 1 tablet by mouth 2 (Two) Times a Day., Disp: , Rfl:   •  omeprazole (priLOSEC) 40 MG capsule, Take 1 capsule by mouth Daily., Disp: , Rfl:   •  prenatal vitamin (prenatal, CLASSIC, vitamin) tablet, Take 1 tablet by mouth Daily., Disp: , Rfl:   •  Amoxicillin & Vonoprazan (Voquezna Dual Sharif) 500 & 20 MG therapy, Take 8 each by mouth Daily. Follow packaging information, Take amoxicillin 1 g 3 times daily, Take fluconazole 20 mg twice daily (Patient not taking: Reported on 3/20/2025), Disp: 112 each, Rfl: 0  •  ondansetron ODT (ZOFRAN-ODT) 4 MG disintegrating tablet, Take 1 tablet by mouth Every 8 (Eight) Hours As Needed for Nausea or Vomiting. (Patient not taking: Reported on 3/20/2025), Disp: 10 tablet, Rfl: 0   Social History     Socioeconomic History   • Marital status:    Tobacco Use   • Smoking status: Never   • Smokeless tobacco: Never   Vaping Use   • Vaping status: Never Used   Substance and Sexual Activity   • Alcohol use: Not Currently   • Drug use: Never   • Sexual activity: Yes     Partners: Male     Birth control/protection: None      Vital Signs:   BP 94/60 (BP Location: Right arm, Patient Position: Sitting, Cuff Size: Adult)   Pulse 71   Temp 98 °F (36.7 °C)   Resp 16   Ht 157.5 cm (62\")   Wt 73.1 kg (161 lb 3.2 oz)   SpO2 98%   BMI 29.48 kg/m²       Physical Exam     Result Review :{Labs  Result Review  Imaging  Med Tab  Media  Procedures :23}   {The following data was reviewed by (Optional):81788}  {Ambulatory Labs " (Optional):12282}  {Data reviewed (Optional):49611:::1}          Assessment and Plan {CC Problem List  Visit Diagnosis   ROS  Review (Popup)  Health Maintenance  Quality  BestPractice  Medications  SmartSets  SnapShot Encounters  Media :23}   There are no diagnoses linked to this encounter.  {Time Spent (Optional):92879}  Follow Up {Instructions Charge Capture  Follow-up Communications :23}  No follow-ups on file.  Patient was given instructions and counseling regarding her condition or for health maintenance advice. Please see specific information pulled into the AVS if appropriate.

## 2025-03-21 ENCOUNTER — TELEPHONE (OUTPATIENT)
Dept: FAMILY MEDICINE CLINIC | Facility: CLINIC | Age: 30
End: 2025-03-21
Payer: COMMERCIAL

## 2025-03-21 ENCOUNTER — PATIENT ROUNDING (BHMG ONLY) (OUTPATIENT)
Dept: SURGERY | Facility: CLINIC | Age: 30
End: 2025-03-21
Payer: COMMERCIAL

## 2025-03-21 NOTE — PROGRESS NOTES
March 21, 2025    Hello, may I speak with Vi Cain?    My name is Leandro      I am  with [facility]  [address]  [facility] [id number] [phone].    Before we get started may I verify your date of birth? 1995    I am calling to officially welcome you to our practice and ask about your recent visit. Is this a good time to talk? yes    Tell me about your visit with us. What things went well?  everything went well.        We're always looking for ways to make our patients' experiences even better. Do you have recommendations on ways we may improve?  no    Overall were you satisfied with your first visit to our practice? yes       I appreciate you taking the time to speak with me today. Is there anything else I can do for you? no      Thank you, and have a great day.

## 2025-03-24 ENCOUNTER — TELEPHONE (OUTPATIENT)
Dept: FAMILY MEDICINE CLINIC | Facility: CLINIC | Age: 30
End: 2025-03-24
Payer: COMMERCIAL

## 2025-03-24 DIAGNOSIS — G43.109 MIGRAINE WITH AURA AND WITHOUT STATUS MIGRAINOSUS, NOT INTRACTABLE: ICD-10-CM

## 2025-03-24 RX ORDER — SUMATRIPTAN 50 MG/1
TABLET, FILM COATED ORAL
Qty: 9 TABLET | Refills: 1 | Status: SHIPPED | OUTPATIENT
Start: 2025-03-24

## 2025-03-24 RX ORDER — FAMOTIDINE 20 MG/1
20 TABLET, FILM COATED ORAL 2 TIMES DAILY
Qty: 60 TABLET | OUTPATIENT
Start: 2025-03-24

## 2025-03-24 NOTE — TELEPHONE ENCOUNTER
Rx Refill Note  Requested Prescriptions     Pending Prescriptions Disp Refills    famotidine (PEPCID) 20 MG tablet [Pharmacy Med Name: FAMOTIDINE 20MG TABLETS] 60 tablet      Sig: TAKE 1 TABLET BY MOUTH TWICE DAILY      Last office visit with prescribing clinician: 3/20/2025   Last telemedicine visit with prescribing clinician: Visit date not found   Next office visit with prescribing clinician: 5/6/2025   Please advise this refill

## 2025-03-24 NOTE — TELEPHONE ENCOUNTER
Pharmacy informed patient that she has a $50 copay and medication will be delivered today or tomorrow.

## 2025-03-24 NOTE — TELEPHONE ENCOUNTER
[facility] has a limit on the amount of this drug. 9 tablets per 30 days supply.   Please send new prescription with quantity approved by insurance.

## 2025-03-26 ENCOUNTER — TELEPHONE (OUTPATIENT)
Dept: SURGERY | Facility: CLINIC | Age: 30
End: 2025-03-26
Payer: COMMERCIAL

## 2025-03-26 NOTE — TELEPHONE ENCOUNTER
Contact patient to inform she does not need to attend PAT appointment instead theY will call her since she had everything done when went to the ER 3/5. Patient verbalized understanding.

## 2025-03-26 NOTE — PRE-PROCEDURE INSTRUCTIONS
History updated by phone w/patient using  #[id number]. Preop instructions reviewed and instructed clears until 2 hrs prior to arrival time dos, voiced understanding.

## 2025-04-01 ENCOUNTER — ANESTHESIA EVENT (OUTPATIENT)
Dept: PERIOP | Facility: HOSPITAL | Age: 30
End: 2025-04-01
Payer: COMMERCIAL

## 2025-04-01 ENCOUNTER — TELEPHONE (OUTPATIENT)
Dept: SURGERY | Facility: CLINIC | Age: 30
End: 2025-04-01
Payer: COMMERCIAL

## 2025-04-01 NOTE — TELEPHONE ENCOUNTER
I called patient to let her know the arrival time for her surgery has changed to 12:00 pm. Patient voiced understanding.

## 2025-04-02 ENCOUNTER — ANESTHESIA (OUTPATIENT)
Dept: PERIOP | Facility: HOSPITAL | Age: 30
End: 2025-04-02
Payer: COMMERCIAL

## 2025-04-02 ENCOUNTER — APPOINTMENT (OUTPATIENT)
Dept: GENERAL RADIOLOGY | Facility: HOSPITAL | Age: 30
End: 2025-04-02
Payer: COMMERCIAL

## 2025-04-02 ENCOUNTER — TELEPHONE (OUTPATIENT)
Dept: SURGERY | Facility: CLINIC | Age: 30
End: 2025-04-02
Payer: COMMERCIAL

## 2025-04-02 ENCOUNTER — HOSPITAL ENCOUNTER (OUTPATIENT)
Facility: HOSPITAL | Age: 30
Setting detail: HOSPITAL OUTPATIENT SURGERY
Discharge: HOME | End: 2025-04-02
Attending: SURGERY | Admitting: SURGERY
Payer: COMMERCIAL

## 2025-04-02 VITALS
DIASTOLIC BLOOD PRESSURE: 73 MMHG | SYSTOLIC BLOOD PRESSURE: 118 MMHG | BODY MASS INDEX: 28.9 KG/M2 | RESPIRATION RATE: 15 BRPM | OXYGEN SATURATION: 99 % | HEART RATE: 82 BPM | TEMPERATURE: 97.7 F | WEIGHT: 158 LBS

## 2025-04-02 DIAGNOSIS — K81.9 CHOLECYSTITIS: ICD-10-CM

## 2025-04-02 LAB — HCG SERPL QL: NEGATIVE

## 2025-04-02 PROCEDURE — 47562 LAPAROSCOPIC CHOLECYSTECTOMY: CPT

## 2025-04-02 PROCEDURE — 25510000001 IOPAMIDOL 61 % SOLUTION: Performed by: SURGERY

## 2025-04-02 PROCEDURE — 25010000002 FENTANYL CITRATE (PF) 50 MCG/ML SOLUTION

## 2025-04-02 PROCEDURE — 25010000002 CEFOXITIN PER 1 G: Performed by: SURGERY

## 2025-04-02 PROCEDURE — 25010000002 FAMOTIDINE 10 MG/ML SOLUTION: Performed by: NURSE ANESTHETIST, CERTIFIED REGISTERED

## 2025-04-02 PROCEDURE — 25010000002 HYDROMORPHONE PER 4 MG

## 2025-04-02 PROCEDURE — 88304 TISSUE EXAM BY PATHOLOGIST: CPT | Performed by: SURGERY

## 2025-04-02 PROCEDURE — 25010000002 KETOROLAC TROMETHAMINE PER 15 MG

## 2025-04-02 PROCEDURE — 25010000002 MIDAZOLAM PER 1MG: Performed by: NURSE ANESTHETIST, CERTIFIED REGISTERED

## 2025-04-02 PROCEDURE — 25010000002 PROPOFOL 200 MG/20ML EMULSION

## 2025-04-02 PROCEDURE — 25810000003 SODIUM CHLORIDE PER 500 ML: Performed by: SURGERY

## 2025-04-02 PROCEDURE — 84703 CHORIONIC GONADOTROPIN ASSAY: CPT | Performed by: NURSE ANESTHETIST, CERTIFIED REGISTERED

## 2025-04-02 PROCEDURE — 25010000002 LIDOCAINE 2% SOLUTION

## 2025-04-02 PROCEDURE — 25810000003 LACTATED RINGERS PER 1000 ML: Performed by: NURSE ANESTHETIST, CERTIFIED REGISTERED

## 2025-04-02 PROCEDURE — 25010000002 ONDANSETRON PER 1 MG: Performed by: NURSE ANESTHETIST, CERTIFIED REGISTERED

## 2025-04-02 PROCEDURE — 25010000002 ONDANSETRON PER 1 MG

## 2025-04-02 PROCEDURE — 25010000002 DEXAMETHASONE PER 1 MG: Performed by: NURSE ANESTHETIST, CERTIFIED REGISTERED

## 2025-04-02 PROCEDURE — 47562 LAPAROSCOPIC CHOLECYSTECTOMY: CPT | Performed by: SURGERY

## 2025-04-02 PROCEDURE — 25010000002 SUCCINYLCHOLINE PER 20 MG

## 2025-04-02 PROCEDURE — C1894 INTRO/SHEATH, NON-LASER: HCPCS | Performed by: SURGERY

## 2025-04-02 PROCEDURE — 25010000002 SUGAMMADEX 200 MG/2ML SOLUTION

## 2025-04-02 DEVICE — CLIP LIGAT VASC HORIZON TI MD/LG GRN 6CT: Type: IMPLANTABLE DEVICE | Site: ABDOMEN | Status: FUNCTIONAL

## 2025-04-02 RX ORDER — EPHEDRINE SULFATE 50 MG/ML
INJECTION, SOLUTION INTRAVENOUS AS NEEDED
Status: DISCONTINUED | OUTPATIENT
Start: 2025-04-02 | End: 2025-04-02 | Stop reason: SURG

## 2025-04-02 RX ORDER — OXYCODONE HYDROCHLORIDE 5 MG/1
5 TABLET ORAL EVERY 4 HOURS PRN
Qty: 12 TABLET | Refills: 0 | Status: SHIPPED | OUTPATIENT
Start: 2025-04-02 | End: 2025-04-09

## 2025-04-02 RX ORDER — DEXAMETHASONE SODIUM PHOSPHATE 4 MG/ML
4 INJECTION, SOLUTION INTRA-ARTICULAR; INTRALESIONAL; INTRAMUSCULAR; INTRAVENOUS; SOFT TISSUE ONCE AS NEEDED
Status: COMPLETED | OUTPATIENT
Start: 2025-04-02 | End: 2025-04-02

## 2025-04-02 RX ORDER — ONDANSETRON 2 MG/ML
4 INJECTION INTRAMUSCULAR; INTRAVENOUS ONCE AS NEEDED
Status: COMPLETED | OUTPATIENT
Start: 2025-04-02 | End: 2025-04-02

## 2025-04-02 RX ORDER — ROCURONIUM BROMIDE 10 MG/ML
INJECTION, SOLUTION INTRAVENOUS AS NEEDED
Status: DISCONTINUED | OUTPATIENT
Start: 2025-04-02 | End: 2025-04-02 | Stop reason: SURG

## 2025-04-02 RX ORDER — OXYCODONE AND ACETAMINOPHEN 5; 325 MG/1; MG/1
1 TABLET ORAL ONCE AS NEEDED
Status: COMPLETED | OUTPATIENT
Start: 2025-04-02 | End: 2025-04-02

## 2025-04-02 RX ORDER — LIDOCAINE HYDROCHLORIDE 10 MG/ML
0.5 INJECTION, SOLUTION EPIDURAL; INFILTRATION; INTRACAUDAL; PERINEURAL ONCE AS NEEDED
Status: DISCONTINUED | OUTPATIENT
Start: 2025-04-02 | End: 2025-04-02 | Stop reason: HOSPADM

## 2025-04-02 RX ORDER — PROPOFOL 10 MG/ML
INJECTION, EMULSION INTRAVENOUS AS NEEDED
Status: DISCONTINUED | OUTPATIENT
Start: 2025-04-02 | End: 2025-04-02 | Stop reason: SURG

## 2025-04-02 RX ORDER — SODIUM CHLORIDE 9 MG/ML
INJECTION, SOLUTION INTRAVENOUS AS NEEDED
Status: DISCONTINUED | OUTPATIENT
Start: 2025-04-02 | End: 2025-04-02 | Stop reason: HOSPADM

## 2025-04-02 RX ORDER — LIDOCAINE HYDROCHLORIDE 20 MG/ML
INJECTION, SOLUTION INFILTRATION; PERINEURAL AS NEEDED
Status: DISCONTINUED | OUTPATIENT
Start: 2025-04-02 | End: 2025-04-02 | Stop reason: SURG

## 2025-04-02 RX ORDER — KETOROLAC TROMETHAMINE 30 MG/ML
INJECTION, SOLUTION INTRAMUSCULAR; INTRAVENOUS AS NEEDED
Status: DISCONTINUED | OUTPATIENT
Start: 2025-04-02 | End: 2025-04-02 | Stop reason: SURG

## 2025-04-02 RX ORDER — SODIUM CHLORIDE 0.9 % (FLUSH) 0.9 %
10 SYRINGE (ML) INJECTION AS NEEDED
Status: DISCONTINUED | OUTPATIENT
Start: 2025-04-02 | End: 2025-04-02 | Stop reason: HOSPADM

## 2025-04-02 RX ORDER — FENTANYL CITRATE 50 UG/ML
INJECTION, SOLUTION INTRAMUSCULAR; INTRAVENOUS AS NEEDED
Status: DISCONTINUED | OUTPATIENT
Start: 2025-04-02 | End: 2025-04-02 | Stop reason: SURG

## 2025-04-02 RX ORDER — IOPAMIDOL 612 MG/ML
INJECTION, SOLUTION INTRAVASCULAR AS NEEDED
Status: DISCONTINUED | OUTPATIENT
Start: 2025-04-02 | End: 2025-04-02 | Stop reason: HOSPADM

## 2025-04-02 RX ORDER — SODIUM CHLORIDE, SODIUM LACTATE, POTASSIUM CHLORIDE, CALCIUM CHLORIDE 600; 310; 30; 20 MG/100ML; MG/100ML; MG/100ML; MG/100ML
100 INJECTION, SOLUTION INTRAVENOUS ONCE
Status: DISCONTINUED | OUTPATIENT
Start: 2025-04-02 | End: 2025-04-02 | Stop reason: HOSPADM

## 2025-04-02 RX ORDER — SUCCINYLCHOLINE CHLORIDE 20 MG/ML
INJECTION INTRAMUSCULAR; INTRAVENOUS AS NEEDED
Status: DISCONTINUED | OUTPATIENT
Start: 2025-04-02 | End: 2025-04-02 | Stop reason: SURG

## 2025-04-02 RX ORDER — SCOPOLAMINE 1 MG/3D
1 PATCH, EXTENDED RELEASE TRANSDERMAL
Status: DISCONTINUED | OUTPATIENT
Start: 2025-04-02 | End: 2025-04-02 | Stop reason: HOSPADM

## 2025-04-02 RX ORDER — MIDAZOLAM HYDROCHLORIDE 2 MG/2ML
1 INJECTION, SOLUTION INTRAMUSCULAR; INTRAVENOUS
Status: DISCONTINUED | OUTPATIENT
Start: 2025-04-02 | End: 2025-04-02 | Stop reason: HOSPADM

## 2025-04-02 RX ORDER — HYDROMORPHONE HYDROCHLORIDE 2 MG/ML
INJECTION, SOLUTION INTRAMUSCULAR; INTRAVENOUS; SUBCUTANEOUS AS NEEDED
Status: DISCONTINUED | OUTPATIENT
Start: 2025-04-02 | End: 2025-04-02 | Stop reason: SURG

## 2025-04-02 RX ORDER — SODIUM CHLORIDE 9 MG/ML
40 INJECTION, SOLUTION INTRAVENOUS AS NEEDED
Status: DISCONTINUED | OUTPATIENT
Start: 2025-04-02 | End: 2025-04-02 | Stop reason: HOSPADM

## 2025-04-02 RX ORDER — SODIUM CHLORIDE 0.9 % (FLUSH) 0.9 %
10 SYRINGE (ML) INJECTION EVERY 12 HOURS SCHEDULED
Status: DISCONTINUED | OUTPATIENT
Start: 2025-04-02 | End: 2025-04-02 | Stop reason: HOSPADM

## 2025-04-02 RX ORDER — BUPIVACAINE HYDROCHLORIDE AND EPINEPHRINE 2.5; 5 MG/ML; UG/ML
INJECTION, SOLUTION INFILTRATION; PERINEURAL AS NEEDED
Status: DISCONTINUED | OUTPATIENT
Start: 2025-04-02 | End: 2025-04-02 | Stop reason: HOSPADM

## 2025-04-02 RX ORDER — FAMOTIDINE 10 MG/ML
20 INJECTION, SOLUTION INTRAVENOUS
Status: COMPLETED | OUTPATIENT
Start: 2025-04-02 | End: 2025-04-02

## 2025-04-02 RX ORDER — MAGNESIUM HYDROXIDE 1200 MG/15ML
LIQUID ORAL AS NEEDED
Status: DISCONTINUED | OUTPATIENT
Start: 2025-04-02 | End: 2025-04-02 | Stop reason: HOSPADM

## 2025-04-02 RX ORDER — SODIUM CHLORIDE, SODIUM LACTATE, POTASSIUM CHLORIDE, CALCIUM CHLORIDE 600; 310; 30; 20 MG/100ML; MG/100ML; MG/100ML; MG/100ML
9 INJECTION, SOLUTION INTRAVENOUS CONTINUOUS PRN
Status: DISCONTINUED | OUTPATIENT
Start: 2025-04-02 | End: 2025-04-02 | Stop reason: HOSPADM

## 2025-04-02 RX ADMIN — HYDROMORPHONE HYDROCHLORIDE 0.5 MG: 2 INJECTION, SOLUTION INTRAMUSCULAR; INTRAVENOUS; SUBCUTANEOUS at 15:49

## 2025-04-02 RX ADMIN — ONDANSETRON 4 MG: 2 INJECTION INTRAMUSCULAR; INTRAVENOUS at 12:30

## 2025-04-02 RX ADMIN — SUGAMMADEX 200 MG: 100 INJECTION, SOLUTION INTRAVENOUS at 15:41

## 2025-04-02 RX ADMIN — SUCCINYLCHOLINE CHLORIDE 160 MG: 20 INJECTION, SOLUTION INTRAMUSCULAR; INTRAVENOUS at 14:49

## 2025-04-02 RX ADMIN — FENTANYL CITRATE 25 MCG: 50 INJECTION, SOLUTION INTRAMUSCULAR; INTRAVENOUS at 15:16

## 2025-04-02 RX ADMIN — ONDANSETRON 4 MG: 2 INJECTION INTRAMUSCULAR; INTRAVENOUS at 17:04

## 2025-04-02 RX ADMIN — ROCURONIUM 10 MG: 50 INJECTION, SOLUTION INTRAVENOUS at 14:48

## 2025-04-02 RX ADMIN — KETOROLAC TROMETHAMINE 30 MG: 30 INJECTION, SOLUTION INTRAMUSCULAR; INTRAVENOUS at 15:46

## 2025-04-02 RX ADMIN — MIDAZOLAM HYDROCHLORIDE 1 MG: 1 INJECTION, SOLUTION INTRAMUSCULAR; INTRAVENOUS at 14:38

## 2025-04-02 RX ADMIN — ROCURONIUM 10 MG: 50 INJECTION, SOLUTION INTRAVENOUS at 15:09

## 2025-04-02 RX ADMIN — SCOPOLAMINE 1 PATCH: 1.5 PATCH, EXTENDED RELEASE TRANSDERMAL at 13:56

## 2025-04-02 RX ADMIN — ROCURONIUM 20 MG: 50 INJECTION, SOLUTION INTRAVENOUS at 15:05

## 2025-04-02 RX ADMIN — SODIUM CHLORIDE, POTASSIUM CHLORIDE, SODIUM LACTATE AND CALCIUM CHLORIDE 9 ML/HR: 600; 310; 30; 20 INJECTION, SOLUTION INTRAVENOUS at 12:23

## 2025-04-02 RX ADMIN — EPHEDRINE SULFATE 5 MG: 50 INJECTION, SOLUTION INTRAVENOUS at 15:38

## 2025-04-02 RX ADMIN — CEFOXITIN 2 G: 2 INJECTION, POWDER, FOR SOLUTION INTRAVENOUS at 14:53

## 2025-04-02 RX ADMIN — LIDOCAINE HYDROCHLORIDE 60 MG: 20 INJECTION, SOLUTION INFILTRATION; PERINEURAL at 14:48

## 2025-04-02 RX ADMIN — DEXAMETHASONE SODIUM PHOSPHATE 4 MG: 4 INJECTION INTRA-ARTICULAR; INTRALESIONAL; INTRAMUSCULAR; INTRAVENOUS; SOFT TISSUE at 12:30

## 2025-04-02 RX ADMIN — PROPOFOL 150 MG: 10 INJECTION, EMULSION INTRAVENOUS at 14:48

## 2025-04-02 RX ADMIN — FAMOTIDINE 20 MG: 10 INJECTION INTRAVENOUS at 12:30

## 2025-04-02 RX ADMIN — FENTANYL CITRATE 25 MCG: 50 INJECTION, SOLUTION INTRAMUSCULAR; INTRAVENOUS at 14:48

## 2025-04-02 RX ADMIN — OXYCODONE HYDROCHLORIDE AND ACETAMINOPHEN 1 TABLET: 5; 325 TABLET ORAL at 17:05

## 2025-04-02 NOTE — ANESTHESIA PREPROCEDURE EVALUATION
Anesthesia Evaluation     Patient summary reviewed, Nursing notes reviewed and pregnancy test completed   no history of anesthetic complications:   NPO Solid Status: > 8 hours  NPO Liquid Status: > 8 hours           Airway   Mallampati: I  TM distance: >3 FB  Neck ROM: full  No difficulty expected  Dental - normal exam     Pulmonary - negative pulmonary ROS and normal exam   Cardiovascular   Exercise tolerance: good (4-7 METS)    Rhythm: regular  Rate: abnormal    (+) dysrhythmias Bradycardia, PAC    PE comment: HR 50s with PAC    Neuro/Psych  (+) headaches (migraines)  GI/Hepatic/Renal/Endo      Musculoskeletal     (+) back pain  Abdominal  - normal exam   Substance History - negative use     OB/GYN negative ob/gyn ROS   (-)  Pregnant        Other - negative ROS                   Anesthesia Plan    ASA 2     general     intravenous induction     Anesthetic plan, risks, benefits, and alternatives have been provided, discussed and informed consent has been obtained with: patient and spouse/significant other (via ).  Pre-procedure education provided  Use of blood products discussed with patient and spouse/significant other  Consented to blood products.      CODE STATUS:

## 2025-04-02 NOTE — OP NOTE
Colorectal & General Surgery  Operative Report    Patient: Vi Cain  YOB: 1995  MRN: [id number]  DATE OF PROCEDURE: 04/02/25     PREOPERATIVE DIAGNOSIS:  Chronic calculus cholecystitis    POSTOPERATIVE DIAGNOSIS:  Same    PROCEDURE:  Laparoscopic cholecystectomy    FINDINGS:  Critical view of safety achieved  Mild chronic inflammation noted.  Cholangiogram was aborted due to inability to cannulate the tiny cystic duct.    SURGEON:  Reza Guerra MD    ASSISTANT:  Assistant: Tika Perkins PA-C was responsible for performing the following activities: Retraction, Suturing, Closing, Placing Dressing, and Held/Positioned Camera and their skilled assistance was necessary for the success of this case.      ANESTHESIA:  General endotracheal    EBL:  5 mL    SPECIMEN:  Gallbladder    OPERATIVE DESCRIPTION:  The patient was brought to the operating room under the care of the nursing staff.  The patient was placed on the operating room table in the supine position where anesthesia was induced.  The patient was then prepped and positioned in the usual sterile fashion.  A standardized timeout was then performed.    The Veress needle was inserted into the right upper quadrant atraumatically.  The abdomen was insufflated to 15 mmHg.  Local anesthetic was infiltrated into all incision sites.  A 5 mm trocar was placed at the umbilicus.  3 additional trocars were placed in the right upper quadrant, the subxiphoid port being 10 mm.  The Veress needle site was inspected.  The gallbladder was identified and attachments to the omentum and duodenum were taken down sharply.  The peritoneum on either side of the gallbladder was incised.  Combination of blunt and sharp dissection was utilized to identify the cystic duct and cystic artery.  Critical view of safety was achieved with 2 and only 2 structures entering the gallbladder.  The cystic duct had 3 clips placed and was divided.  The cystic artery was then  clipped, with 1 clip distally and 2 clips proximally.  It was also divided.  The gallbladder was then removed from the liver with the Bovie electrocautery.  The gallbladder was placed in an Endo Catch bag.  Hemostasis was verified.  Clip placement was verified.  The gallbladder was then removed through the subxiphoid port, which was closed with 0 Vicryl suture.  The remaining trocars were removed and the abdomen was desufflated.  All incisions were copiously irrigated.  The incisions were closed with 3-0 Monocryl suture and Exofin.    All needle, sponge, and instrument counts were correct at the end of the case.    The patient tolerated the procedure well and was transferred to the postanesthesia care unit in stable condition.    Reza Guerra M.D.  Colorectal & General Surgery  [facility]    [address] [place] [address], [address]  [place], [place], [address]  P: [phone]  F: [phone]

## 2025-04-02 NOTE — ANESTHESIA PROCEDURE NOTES
Airway  Reason: elective    Date/Time: 4/2/2025 2:53 PM  Airway not difficult    General Information and Staff    Patient location during procedure: OR    Indications and Patient Condition  Indications for airway management: airway protection    Preoxygenated: yes    Mask difficulty assessment: 1 - vent by mask    Final Airway Details    Final airway type: endotracheal airway      Successful airway: ETT  Cuffed: yes   Successful intubation technique: direct laryngoscopy  Endotracheal tube insertion site: oral  Blade: Be  Blade size: 2  ETT size (mm): 7.0  Cormack-Lehane Classification: grade I - full view of glottis  Placement verified by: chest auscultation and capnometry   Measured from: lips  ETT/EBT  to lips (cm): 22  Number of attempts at approach: 1  Assessment: lips, teeth, and gum same as pre-op and atraumatic intubation

## 2025-04-02 NOTE — DISCHARGE INSTRUCTIONS
POST OP RECOMMENDATIONS  Dr. Reza Guerra  [phone]  Discharge Instructions    Activity  No lifting more than 10 pounds for 4 weeks  Diet  As tolerated.  Avoid spicy and greasy foods for a few days.  Dressings  The glue on your incisions will fall off on its own in 1-2 weeks.  You do not need to pack any of your incisions  Bathing  It's ok to shower anytime.   Do not submerge your incisions (bath, pool, lake, ocean, etc.) for 3 weeks.  You can wash your incisions with warm soapy water very gently and pat dry  Pain Management  Unless you have been told otherwise, it's ok to take Tylenol 1000 mg every 6 hours as needed.  I have provided you a prescription for a narcotic pain medication. Take this as needed.   I have also provided you a muscle relaxer if you were still using it in the hospital. Take this scheduled for a couple days, and then you can transition to taking it only as needed.   Please call the office if you have intense pain that is not relieved.   Blood clot prevention  You should be up walking multiple times each day to prevent blood clots from forming.   Driving  Do not drive while taking narcotic pain medications.   Before driving, make sure that you are able to move and rotate appropriately to keep you and the rest of us safe on the road.   Follow up appointment  My office will call you with a follow up appointment if you do not have one already. It will be in 1-2 weeks.   If you do not hear from my office in 1 week, please call [phone] to ask about scheduling.   Remember to contact me for any of the following:   Fever > 101 degrees  Severe pain that cannot be controlled by taking your pain pills  Severe nausea or vomiting that cannot be controlled by taking your nausea pills  Significant bleeding of your incisions  Drainage that has a bad smell or is yellow or green in appearance  Any other questions or concerns

## 2025-04-02 NOTE — ANESTHESIA POSTPROCEDURE EVALUATION
Patient: Vi Cain    Procedure Summary       Date: 04/02/25 Room / Location:  [facility] /  [facility]    Anesthesia Start: 1443 Anesthesia Stop: 1614    Procedure: CHOLECYSTECTOMY LAPAROSCOPIC (Abdomen) Diagnosis:       Cholecystitis      (Cholecystitis [K81.9])    Surgeons: Kyler Guerra MD Provider: Tika Harris CRNA    Anesthesia Type: general ASA Status: 2            Anesthesia Type: general    Vitals  Vitals Value Taken Time   /75 04/02/25 16:45   Temp 97.7 °F (36.5 °C) 04/02/25 16:10   Pulse 77 04/02/25 16:45   Resp 16 04/02/25 16:45   SpO2 100 % 04/02/25 16:45           Post Anesthesia Care and Evaluation    Patient location during evaluation: PHASE II  Patient participation: complete - patient participated  Level of consciousness: awake  Pain management: adequate    Airway patency: patent  Anesthetic complications: No anesthetic complications  PONV Status: none  Cardiovascular status: acceptable  Respiratory status: acceptable  Hydration status: acceptable

## 2025-04-02 NOTE — TELEPHONE ENCOUNTER
Attempted to contact patient to schedule a 2 week post op appointment for gallbladder. Voicemail is not set up.    annually

## 2025-04-04 LAB
CYTO UR: NORMAL
LAB AP CASE REPORT: NORMAL
PATH REPORT.FINAL DX SPEC: NORMAL
PATH REPORT.GROSS SPEC: NORMAL

## 2025-04-16 ENCOUNTER — OFFICE VISIT (OUTPATIENT)
Dept: SURGERY | Facility: CLINIC | Age: 30
End: 2025-04-16
Payer: COMMERCIAL

## 2025-04-16 VITALS — BODY MASS INDEX: 28.52 KG/M2 | HEIGHT: 62 IN | WEIGHT: 155 LBS

## 2025-04-16 DIAGNOSIS — K81.9 CHOLECYSTITIS: Primary | ICD-10-CM

## 2025-04-16 PROCEDURE — 99024 POSTOP FOLLOW-UP VISIT: CPT | Performed by: SURGERY

## 2025-04-16 NOTE — PROGRESS NOTES
Colorectal & General Surgery  Post - Op    Patient: Vi Cain  YOB: 1995  MRN: [id number]      Assessment  Vi Cain is a 30 y.o. female with chronic calculus cholecystitis and status post laparoscopic cholecystectomy.  She has recovered very well from surgery.  She longer has any abdominal pain.  She is tolerating regular diet.  She has no postoperative concerns today.  I reviewed her pathology with her.  We discussed lifting restrictions and diet.    She is welcome to follow-up with me on an as-needed basis.      History of Present Illness   Vi Cain is a 30 y.o. female who presents in postoperative follow-up today with no complaints.    Vital Signs  There were no vitals filed for this visit.     Physical Exam  Constitutional: Resting comfortably, no acute distress  Neck: Supple, trachea midline  Respiratory: No increased work of breathing, Symmetric excursion  Cardiovascular: Well pefursed, no jugular venous distention evident   Abdominal: Incisions are in good order.  Soft, non-tender, non-distended  Lymphatics: No cervical or suprascapular adenopathy  Skin: Warm, dry, no rash on visualized skin surfaces  Musculoskeletal: Symmetric strength, no obvious gross abnormalities  Psychiatric: Alert and oriented ×3, normal affect   Chronic calculus cholecystitis    Pathology  I have personally reviewed pathology results with the patient demonstrating chronic calculus cholecystitis    Reza Guerra MD  Colorectal & General Surgery  [facility] [place], operated by [facility]    [address], [address]  [place], [place], [address]  P: [phone]  F: [phone]

## 2025-05-06 ENCOUNTER — OFFICE VISIT (OUTPATIENT)
Dept: FAMILY MEDICINE CLINIC | Facility: CLINIC | Age: 30
End: 2025-05-06
Payer: COMMERCIAL

## 2025-05-06 VITALS
OXYGEN SATURATION: 99 % | WEIGHT: 158.2 LBS | HEART RATE: 65 BPM | RESPIRATION RATE: 16 BRPM | SYSTOLIC BLOOD PRESSURE: 90 MMHG | TEMPERATURE: 98.3 F | DIASTOLIC BLOOD PRESSURE: 50 MMHG | HEIGHT: 62 IN | BODY MASS INDEX: 29.11 KG/M2

## 2025-05-06 DIAGNOSIS — G43.109 MIGRAINE WITH AURA AND WITHOUT STATUS MIGRAINOSUS, NOT INTRACTABLE: Primary | ICD-10-CM

## 2025-05-06 DIAGNOSIS — R30.0 DYSURIA: ICD-10-CM

## 2025-05-06 DIAGNOSIS — N93.9 ABNORMAL VAGINAL BLEEDING: ICD-10-CM

## 2025-05-06 LAB
B-HCG UR QL: NEGATIVE
BILIRUB BLD-MCNC: NEGATIVE MG/DL
CLARITY, POC: ABNORMAL
COLOR UR: ABNORMAL
EXPIRATION DATE: ABNORMAL
EXPIRATION DATE: NORMAL
GLUCOSE UR STRIP-MCNC: NEGATIVE MG/DL
INTERNAL NEGATIVE CONTROL: NORMAL
INTERNAL POSITIVE CONTROL: NORMAL
KETONES UR QL: NEGATIVE
LEUKOCYTE EST, POC: NEGATIVE
Lab: ABNORMAL
Lab: NORMAL
NITRITE UR-MCNC: NEGATIVE MG/ML
PH UR: 5.5 [PH] (ref 5–8)
PROT UR STRIP-MCNC: NEGATIVE MG/DL
RBC # UR STRIP: ABNORMAL /UL
SP GR UR: 1.02 (ref 1–1.03)
UROBILINOGEN UR QL: NORMAL

## 2025-05-06 RX ORDER — SUMATRIPTAN 50 MG/1
TABLET, FILM COATED ORAL
Qty: 6 TABLET | Refills: 1 | Status: SHIPPED | OUTPATIENT
Start: 2025-05-06

## 2025-05-06 NOTE — PROGRESS NOTES
"Chief Complaint  Constipation, Dysmenorrhea (Bleeding for about 3 weeks with cramps. ), and Urinary Frequency    Subjective         Vi Cain presents to [facility]  History of Present Illness    Patient speaks [place], previous reported by [facility] ,  ID [id number]    30-year-old female presents today for migraine follow-up.  Patient was started on propranolol 20 mg and sumatriptan 50 mg.  Patient states she stopped taking medications as she has not had any headache since was last seen on 3/20/2025.      Patient complains today of increased urination frequency and burning urination for 5 days, this has improved since then.  Patient also complains of abdominal vaginal bleeding.  She states her menstrual cycle started on 4/7/2025 and still going, she has not contacted her GYN provider.  Patient has lower abdominal pain, she denies back pain.  Patient denies fevers or chills.  Patient is postpartum, she delivered on 1/23/2025    Objective     Review of Systems     Past Medical History:   Diagnosis Date    Cholecystitis 03/19/2025    Gastritis     Migraines         Current Outpatient Medications:     SUMAtriptan (Imitrex) 50 MG tablet, Take one tablet at onset of headache. May repeat dose one time in 2 hours if headache not relieved., Disp: 6 tablet, Rfl: 1   Social History     Socioeconomic History    Marital status:    Tobacco Use    Smoking status: Never    Smokeless tobacco: Never   Vaping Use    Vaping status: Never Used   Substance and Sexual Activity    Alcohol use: Not Currently    Drug use: Never    Sexual activity: Yes     Partners: Male     Birth control/protection: None      Vital Signs:   BP 90/50 (BP Location: Right arm, Patient Position: Sitting)   Pulse 65   Temp 98.3 °F (36.8 °C)   Resp 16   Ht 157.5 cm (62\")   Wt 71.8 kg (158 lb 3.2 oz)   SpO2 99%   BMI 28.94 kg/m²       Physical Exam  Constitutional:       Appearance: Normal " appearance.   Abdominal:      General: Abdomen is flat.      Tenderness: There is abdominal tenderness in the right lower quadrant, suprapubic area and left lower quadrant. There is no right CVA tenderness, left CVA tenderness or guarding. Negative signs include Haile's sign and McBurney's sign.   Neurological:      Mental Status: She is alert.          Result Review :                 Assessment and Plan    Diagnoses and all orders for this visit:    1. Migraine with aura and without status migrainosus, not intractable (Primary)  -     SUMAtriptan (Imitrex) 50 MG tablet; Take one tablet at onset of headache. May repeat dose one time in 2 hours if headache not relieved.  Dispense: 6 tablet; Refill: 1    2. Abnormal vaginal bleeding  -     POCT pregnancy, urine  -     Cancel: POC Pregnancy, Urine    3. Dysuria  -     POC Urinalysis Dipstick, Automated  -     Cancel: POCT urinalysis dipstick, automated    Migraine  Continue sumatriptan as needed  Stop propranolol as the frequency of her migraine has decreased without using the medication, also has baseline low blood pressure.    Abnormal vaginal bleeding  POCT pregnancy test negative  Advised patient to follow with OB/GYN    Dysuria/frequent urination  Unspecified etiology, POCT UA shows hematuria +3, otherwise negative pyuria and nitrate.  Possible nephrolithiasis, unlikely UTI.  Her abdominal exam shows suprapubic and bilateral lower quadrant tenderness, no CVA tenderness, no fever, vital signs at baseline.  She does have hematuria, however this could be due to to her abnormal vaginal bleeding.  Her symptoms are improving, advised patient to follow-up with her GYN regarding her abnormal vaginal bleeding and to monitor her symptoms.  If she had worsening pain or fever she was advised to go to the hospital  Will consider abdominal CT if her GYN workup was negative.        Follow Up   No follow-ups on file.  Patient was given instructions and counseling regarding her  condition or for health maintenance advice. Please see specific information pulled into the AVS if appropriate.

## 2025-06-02 DIAGNOSIS — K29.70 GASTRITIS, PRESENCE OF BLEEDING UNSPECIFIED, UNSPECIFIED CHRONICITY, UNSPECIFIED GASTRITIS TYPE: ICD-10-CM

## 2025-06-02 RX ORDER — OMEPRAZOLE 40 MG/1
40 CAPSULE, DELAYED RELEASE ORAL DAILY
Qty: 90 CAPSULE | Refills: 0 | OUTPATIENT
Start: 2025-06-02

## 2025-07-23 ENCOUNTER — OFFICE VISIT (OUTPATIENT)
Dept: OBSTETRICS AND GYNECOLOGY | Age: 30
End: 2025-07-23
Payer: COMMERCIAL

## 2025-07-23 VITALS
HEIGHT: 62 IN | BODY MASS INDEX: 28.89 KG/M2 | SYSTOLIC BLOOD PRESSURE: 112 MMHG | DIASTOLIC BLOOD PRESSURE: 84 MMHG | WEIGHT: 157 LBS

## 2025-07-23 DIAGNOSIS — N93.9 ABNORMAL UTERINE BLEEDING (AUB): ICD-10-CM

## 2025-07-23 DIAGNOSIS — Z12.4 SCREENING FOR MALIGNANT NEOPLASM OF CERVIX: ICD-10-CM

## 2025-07-23 DIAGNOSIS — Z11.51 SCREENING FOR HUMAN PAPILLOMAVIRUS (HPV): ICD-10-CM

## 2025-07-23 DIAGNOSIS — Z11.3 SCREEN FOR STD (SEXUALLY TRANSMITTED DISEASE): ICD-10-CM

## 2025-07-23 DIAGNOSIS — N94.6 DYSMENORRHEA: ICD-10-CM

## 2025-07-23 DIAGNOSIS — Z13.89 SCREENING FOR BLOOD OR PROTEIN IN URINE: ICD-10-CM

## 2025-07-23 DIAGNOSIS — Z01.419 WELL FEMALE EXAM WITH ROUTINE GYNECOLOGICAL EXAM: Primary | ICD-10-CM

## 2025-07-23 PROBLEM — N34.1 NONGONOCOCCAL URETHRITIS DUE TO UREAPLASMA UREALYTICUM: Status: RESOLVED | Noted: 2022-09-15 | Resolved: 2025-07-23

## 2025-07-23 PROBLEM — O26.849 FUNDAL HEIGHT LOW FOR DATES: Status: RESOLVED | Noted: 2022-10-17 | Resolved: 2025-07-23

## 2025-07-23 PROBLEM — O99.820 GBS (GROUP B STREPTOCOCCUS CARRIER), +RV CULTURE, CURRENTLY PREGNANT: Status: RESOLVED | Noted: 2022-12-21 | Resolved: 2025-07-23

## 2025-07-23 PROBLEM — A49.3 MYCOPLASMA INFECTION: Status: RESOLVED | Noted: 2022-09-15 | Resolved: 2025-07-23

## 2025-07-23 PROBLEM — A49.3 NONGONOCOCCAL URETHRITIS DUE TO UREAPLASMA UREALYTICUM: Status: RESOLVED | Noted: 2022-09-15 | Resolved: 2025-07-23

## 2025-07-23 PROBLEM — O09.299 HISTORY OF OLIGOHYDRAMNIOS IN PRIOR PREGNANCY, CURRENTLY PREGNANT: Status: RESOLVED | Noted: 2022-10-17 | Resolved: 2025-07-23

## 2025-07-23 PROBLEM — O28.8 AFI (AMNIOTIC FLUID INDEX) BORDERLINE LOW: Status: RESOLVED | Noted: 2022-12-19 | Resolved: 2025-07-23

## 2025-07-23 PROBLEM — O99.019 MATERNAL ANEMIA IN PREGNANCY, ANTEPARTUM: Status: RESOLVED | Noted: 2022-12-19 | Resolved: 2025-07-23

## 2025-07-23 LAB
BILIRUB BLD-MCNC: NEGATIVE MG/DL
CLARITY, POC: CLEAR
COLOR UR: YELLOW
GLUCOSE UR STRIP-MCNC: NEGATIVE MG/DL
KETONES UR QL: NEGATIVE
LEUKOCYTE EST, POC: NEGATIVE
NITRITE UR-MCNC: NEGATIVE MG/ML
PH UR: 5.5 [PH] (ref 5–8)
PROT UR STRIP-MCNC: NEGATIVE MG/DL
RBC # UR STRIP: ABNORMAL /UL
SP GR UR: 1.02 (ref 1–1.03)
UROBILINOGEN UR QL: ABNORMAL

## 2025-07-23 RX ORDER — IBUPROFEN 800 MG/1
800 TABLET, FILM COATED ORAL
COMMUNITY
Start: 2025-05-20

## 2025-07-23 RX ORDER — NORETHINDRONE ACETATE AND ETHINYL ESTRADIOL .02; 1 MG/1; MG/1
1 TABLET ORAL DAILY
Qty: 21 TABLET | Refills: 12 | Status: SHIPPED | OUTPATIENT
Start: 2025-07-23 | End: 2026-07-23

## 2025-07-23 NOTE — PROGRESS NOTES
[facility]   Obstetrics and Gynecology     2025    Patient: Vi Cain          MR#:[id number]    History of Present Illness    Chief Complaint   Patient presents with    Gynecologic Exam     New annual- uterine bleeding last pap 2022- neg- US today, long heavy periods.         30 y.o. female  who presents for annual exam.    The patient reports heavy menstrual flow with severe cramps for the last 2-1/2 months since coming off Depo-Provera.  We discussed alternatives to Depo-Provera for contraception and managing her symptoms and the patient is willing to try oral contraceptive pill        Relevant data reviewed:    Patient's last menstrual period was 2025.  Obstetric History:  OB History          5    Para   5    Term   4       1    AB   0    Living   5         SAB   0    IAB   0    Ectopic   0    Molar   0    Multiple   0    Live Births   5               Menstrual History:     Patient's last menstrual period was 2025.       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: treated 9/15/22 09/15/2022     Past Surgical History:   Procedure Laterality Date     SECTION N/A 2022    Procedure:  SECTION PRIMARY;  Surgeon: Jasper Don DO;  Location: [facility] LABOR DELIVERY;  Service: Obstetrics;  Laterality: N/A;    CHOLECYSTECTOMY WITH INTRAOPERATIVE CHOLANGIOGRAM N/A 2025    Procedure: CHOLECYSTECTOMY LAPAROSCOPIC;  Surgeon: Kyler Guerra MD;  Location: [facility] OR;  Service: General;  Laterality: N/A;  "    Social History     Tobacco Use   Smoking Status Never    Passive exposure: Never   Smokeless Tobacco Never     Family History   Problem Relation Age of Onset    Uterine cancer Maternal Aunt     Breast cancer Neg Hx     Ovarian cancer Neg Hx     Colon cancer Neg Hx     Deep vein thrombosis Neg Hx     Pulmonary embolism Neg Hx     Birth defects Neg Hx     Mental retardation Neg Hx     Malig Hyperthermia Neg Hx      Prior to Admission medications    Medication Sig Start Date End Date Taking? Authorizing Provider   ibuprofen (ADVIL,MOTRIN) 800 MG tablet 1 tablet.  Patient not taking: Reported on 7/23/2025 5/20/25   ProviderPablo MD   SUMAtriptan (Imitrex) 50 MG tablet Take one tablet at onset of headache. May repeat dose one time in 2 hours if headache not relieved.  Patient not taking: Reported on 7/23/2025 5/6/25   Werner Worthington MD     _______________________________________    Current contraception: none  History of abnormal Pap smear: no  Family history of uterine or ovarian cancer: no  Family History of colon cancer/colon polyps: no  History of abnormal mammogram: no  History of abnormal lipids: no    The following portions of the patient's history were reviewed and updated as appropriate: allergies, current medications, past family history, past medical history, past social history, past surgical history, and problem list.    Review of Systems    Pertinent items are noted in HPI.       Objective   Physical Exam    /84   Ht 157.5 cm (62\")   Wt 71.2 kg (157 lb)   LMP 06/09/2025   BMI 28.72 kg/m²    BP Readings from Last 3 Encounters:   07/23/25 112/84   05/06/25 90/50   04/02/25 118/73      Wt Readings from Last 3 Encounters:   07/23/25 71.2 kg (157 lb)   05/06/25 71.8 kg (158 lb 3.2 oz)   04/16/25 70.3 kg (155 lb)        BMI: Estimated body mass index is 28.72 kg/m² as calculated from the following:    Height as of this encounter: 157.5 cm (62\").    Weight as of this encounter: 71.2 kg " (157 lb).       General: alert, appears stated age, and cooperative   Heart: regular rate and rhythm, S1, S2 normal, no murmur, click, rub or gallop   Lungs: clear to auscultation bilaterally   Abdomen: soft, non-tender, without masses, no organomegaly   Breast: inspection negative, no nipple discharge or bleeding, no masses or nodularity palpable   External genitalia/Vulva: External genitalia including bartholin's glands, Urethra, [place]'s gland and urethra meatus are normal, Perineum, rectum and anus appear normal , and Bladder appears normal without significant prolapse    Vagina: normal mucosa, normal discharge   Cervix: no lesions   Uterus: normal size and non-tender   Adnexa: normal adnexa     As part of wellness and prevention, the following topics were discussed with the patient:  Encouraged self breast exam  Physical activity and regular exercised encouraged.   Contraception discussed.         Problem List   Meds  History  Prep for Surg   Imagin}    Assessment:     Well female exam with routine gynecological exam    Orders:    IGP, Apt HPV,rfx 16 / 18,45    Screening for human papillomavirus (HPV)    Orders:    IGP, Apt HPV,rfx 16 / 18,45    Screening for malignant neoplasm of cervix    Orders:    IGP, Apt HPV,rfx 16 / 18,45    Screening for blood or protein in urine    Orders:    POC Urinalysis Dipstick    Screen for STD (sexually transmitted disease)    Orders:    NuSwab VG+ - Swab, Vagina    Abnormal uterine bleeding (AUB)  Reviewed ultrasound.  Discussed options for managing symptoms and suggest oral contraceptive pill,       Dysmenorrhea  Likely related to heavier menstrual flow that will hopefully anisa with oral contraceptive pills       Plan:  Return in 1 year (on 2026) for Annual exam.    Donald Pink MD  2025 12:40 EDT

## 2025-07-25 LAB
A VAGINAE DNA VAG QL NAA+PROBE: NORMAL SCORE
BVAB2 DNA VAG QL NAA+PROBE: NORMAL SCORE
C ALBICANS DNA VAG QL NAA+PROBE: NEGATIVE
C GLABRATA DNA VAG QL NAA+PROBE: NEGATIVE
C TRACH DNA SPEC QL NAA+PROBE: NEGATIVE
CYTOLOGIST CVX/VAG CYTO: NORMAL
CYTOLOGY CVX/VAG DOC CYTO: NORMAL
CYTOLOGY CVX/VAG DOC THIN PREP: NORMAL
DX ICD CODE: NORMAL
HPV I/H RISK 4 DNA CVX QL PROBE+SIG AMP: NEGATIVE
MEGA1 DNA VAG QL NAA+PROBE: NORMAL SCORE
N GONORRHOEA DNA VAG QL NAA+PROBE: NEGATIVE
OTHER STN SPEC: NORMAL
SERVICE CMNT-IMP: NORMAL
STAT OF ADQ CVX/VAG CYTO-IMP: NORMAL
T VAGINALIS DNA VAG QL NAA+PROBE: NEGATIVE

## (undated) DEVICE — CATH CHOLANG 4.5F18IN BRGNDY

## (undated) DEVICE — DEV SUT GRSPR CLOSUR 15CM 14G

## (undated) DEVICE — CONTAINER,SPECIMEN,OR STERILE,4OZ: Brand: MEDLINE

## (undated) DEVICE — PREP SOL POVIDONE/IODINE BT 4OZ

## (undated) DEVICE — SYR LL TP 10ML STRL

## (undated) DEVICE — TROCAR: Brand: KII SLEEVE

## (undated) DEVICE — DISPOSABLE MONOPOLAR ENDOSCOPIC CORD 10 FT. (3M): Brand: KIRWAN

## (undated) DEVICE — ENDOPOUCH RETRIEVER SPECIMEN RETRIEVAL BAGS: Brand: ENDOPOUCH RETRIEVER

## (undated) DEVICE — CATH IV INSYTE AUTOGARD 14G 1 1/2IN ORNG

## (undated) DEVICE — ST EXT IV TBG W SECUR LK 20IN

## (undated) DEVICE — SUCTION CANISTER, 1000CC,SAFELINER: Brand: DEROYAL

## (undated) DEVICE — 3M™ IOBAN™ 2 ANTIMICROBIAL INCISE DRAPE 6650EZ: Brand: IOBAN™ 2

## (undated) DEVICE — ANTIBACTERIAL UNDYED BRAIDED (POLYGLACTIN 910), SYNTHETIC ABSORBABLE SUTURE: Brand: COATED VICRYL

## (undated) DEVICE — ENDOPATH PNEUMONEEDLE INSUFFLATION NEEDLES WITH LUER LOCK CONNECTORS 120MM: Brand: ENDOPATH

## (undated) DEVICE — GLV SURG SENSICARE W/ALOE PF LF 7.5 STRL

## (undated) DEVICE — HYDROGEL COATED LATEX URINE METER FOLEY TRAY,16 FR/CH (5.3 MM), 5 ML CATHETER PRE-CONNECTED TO 2000 ML DRAINAGE BAG WITH NEEDLE SAMPLING: Brand: DOVER

## (undated) DEVICE — APPL CHLORAPREP HI/LITE 26ML ORNG

## (undated) DEVICE — LAPAROSCOPIC SCOPE WARMER: Brand: DEROYAL

## (undated) DEVICE — PK C/SECT 40

## (undated) DEVICE — SOL IRR H2O BTL 1000ML STRL

## (undated) DEVICE — SUT MNCRYL PLS ANTIB UD 4/0 PS2 18IN

## (undated) DEVICE — SOL NACL 0.9PCT 1000ML

## (undated) DEVICE — SYR LUERLOK 30CC

## (undated) DEVICE — EXOFIN PRECISION PEN HIGH VISCOSITY TOPICAL SKIN ADHESIVE: Brand: EXOFIN PRECISION PEN, 1G

## (undated) DEVICE — TROCAR: Brand: KII FIOS FIRST ENTRY

## (undated) DEVICE — APPL CHLORAPREP W/TINT 26ML ORNG

## (undated) DEVICE — STPCK 3WY D201 DISCOFIX

## (undated) DEVICE — TRY SKINPREP DRYPREP

## (undated) DEVICE — ENDOCUT SCISSOR TIP, DISPOSABLE: Brand: RENEW

## (undated) DEVICE — GLV SURG PREMIERPRO ORTHO LTX PF SZ7 BRN

## (undated) DEVICE — SUT VIC 0 UR6 27IN VCP603H

## (undated) DEVICE — LAPAROSCOPIC SMOKE FILTRATION SYSTEM: Brand: PALL LAPAROSHIELD® PLUS LAPAROSCOPIC SMOKE FILTRATION SYSTEM

## (undated) DEVICE — PK LAP GEN 90

## (undated) DEVICE — DRP C/ARM 41X74IN

## (undated) DEVICE — 2, DISPOSABLE SUCTION/IRRIGATOR WITH DISPOSABLE TIP: Brand: STRYKEFLOW

## (undated) DEVICE — SOL IRR H2O BO 1000ML STRL

## (undated) DEVICE — SYR LUERLOK 20CC BX/50

## (undated) DEVICE — SUT VIC 0 CTX 36IN J978H